# Patient Record
Sex: MALE | Race: OTHER | NOT HISPANIC OR LATINO | URBAN - METROPOLITAN AREA
[De-identification: names, ages, dates, MRNs, and addresses within clinical notes are randomized per-mention and may not be internally consistent; named-entity substitution may affect disease eponyms.]

---

## 2023-03-03 LAB — CALPROTECTIN, STOOL: 140 UG/G

## 2023-04-04 ENCOUNTER — APPOINTMENT (OUTPATIENT)
Dept: LAB | Facility: LAB | Age: 38
End: 2023-04-04
Payer: COMMERCIAL

## 2023-04-04 LAB
ALANINE AMINOTRANSFERASE (SGPT) (U/L) IN SER/PLAS: 15 U/L (ref 10–52)
ALBUMIN (G/DL) IN SER/PLAS: 4.2 G/DL (ref 3.4–5)
ALKALINE PHOSPHATASE (U/L) IN SER/PLAS: 58 U/L (ref 33–120)
ANION GAP IN SER/PLAS: 12 MMOL/L (ref 10–20)
ASPARTATE AMINOTRANSFERASE (SGOT) (U/L) IN SER/PLAS: 12 U/L (ref 9–39)
BILIRUBIN TOTAL (MG/DL) IN SER/PLAS: 0.4 MG/DL (ref 0–1.2)
C REACTIVE PROTEIN (MG/L) IN SER/PLAS: 0.85 MG/DL
CALCIUM (MG/DL) IN SER/PLAS: 9.5 MG/DL (ref 8.6–10.6)
CARBON DIOXIDE, TOTAL (MMOL/L) IN SER/PLAS: 28 MMOL/L (ref 21–32)
CHLORIDE (MMOL/L) IN SER/PLAS: 105 MMOL/L (ref 98–107)
CREATININE (MG/DL) IN SER/PLAS: 0.76 MG/DL (ref 0.5–1.3)
GFR MALE: >90 ML/MIN/1.73M2
GLUCOSE (MG/DL) IN SER/PLAS: 90 MG/DL (ref 74–99)
POTASSIUM (MMOL/L) IN SER/PLAS: 4.2 MMOL/L (ref 3.5–5.3)
PROTEIN TOTAL: 7.4 G/DL (ref 6.4–8.2)
SEDIMENTATION RATE, ERYTHROCYTE: 3 MM/H (ref 0–15)
SODIUM (MMOL/L) IN SER/PLAS: 141 MMOL/L (ref 136–145)
UREA NITROGEN (MG/DL) IN SER/PLAS: 15 MG/DL (ref 6–23)

## 2023-06-17 LAB
CALPROTECTIN, STOOL: 358 UG/G
SARS-COV-2 RESULT: NOT DETECTED

## 2023-06-21 ENCOUNTER — OFFICE VISIT (OUTPATIENT)
Dept: PRIMARY CARE | Facility: CLINIC | Age: 38
End: 2023-06-21
Payer: COMMERCIAL

## 2023-06-21 ENCOUNTER — LAB (OUTPATIENT)
Dept: LAB | Facility: LAB | Age: 38
End: 2023-06-21
Payer: COMMERCIAL

## 2023-06-21 DIAGNOSIS — J02.9 ACUTE PHARYNGITIS, UNSPECIFIED ETIOLOGY: ICD-10-CM

## 2023-06-21 DIAGNOSIS — J02.9 ACUTE PHARYNGITIS, UNSPECIFIED ETIOLOGY: Primary | ICD-10-CM

## 2023-06-21 LAB
EBV INTERPRETATION: ABNORMAL
EPSTEIN-BARR VCA IGG: POSITIVE
EPSTEIN-BARR VCA IGM: NEGATIVE
EPSTEIN-BARR VIRUS EARLY ANTIGEN ANTIBODY, IGG: POSITIVE
EPSTIEN-BARR NUCLEAR ANTIGEN AB: POSITIVE
POC RAPID STREP: NEGATIVE

## 2023-06-21 PROCEDURE — 86645 CMV ANTIBODY IGM: CPT

## 2023-06-21 PROCEDURE — 86663 EPSTEIN-BARR ANTIBODY: CPT

## 2023-06-21 PROCEDURE — 87081 CULTURE SCREEN ONLY: CPT

## 2023-06-21 PROCEDURE — 86664 EPSTEIN-BARR NUCLEAR ANTIGEN: CPT

## 2023-06-21 PROCEDURE — 86665 EPSTEIN-BARR CAPSID VCA: CPT

## 2023-06-21 PROCEDURE — 87205 SMEAR GRAM STAIN: CPT

## 2023-06-21 PROCEDURE — 87635 SARS-COV-2 COVID-19 AMP PRB: CPT

## 2023-06-21 PROCEDURE — 36415 COLL VENOUS BLD VENIPUNCTURE: CPT

## 2023-06-21 PROCEDURE — 87880 STREP A ASSAY W/OPTIC: CPT | Performed by: INTERNAL MEDICINE

## 2023-06-21 PROCEDURE — 99213 OFFICE O/P EST LOW 20 MIN: CPT | Performed by: INTERNAL MEDICINE

## 2023-06-21 ASSESSMENT — ENCOUNTER SYMPTOMS
VOMITING: 0
TROUBLE SWALLOWING: 0
SHORTNESS OF BREATH: 0
HOARSE VOICE: 0
SWOLLEN GLANDS: 1
NECK PAIN: 0
COUGH: 0
ABDOMINAL PAIN: 0
SORE THROAT: 1
DIARRHEA: 1
STRIDOR: 0
HEADACHES: 0

## 2023-06-21 NOTE — PROGRESS NOTES
Answers submitted by the patient for this visit:  Sore Throat Questionnaire (Submitted on 6/21/2023)  Chief Complaint: Sore throat  Chronicity: new  Onset: in the past 7 days  Progression since onset: gradually improving  Pain worse on: left  Fever: 102 - 102.9 F  Fever duration: 3 to 4 days  Pain - numeric: 5/10  abdominal pain: No  congestion: No  cough: No  diarrhea: Yes  drooling: No  ear discharge: No  ear pain: No  headaches: No  hoarse voice: No  neck pain: No  plugged ear sensation: No  shortness of breath: No  stridor: No  swollen glands: Yes  trouble swallowing: No  vomiting: No  strep: No  mono: No      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     1. Pharyngitis with ER visit 6.17, 6.19.23   -presented with odynophagia and fever to 102 startnig 6.17.23   -rapid strep x1 in ER neg - no other testing performed   -reports no rhinrorhea, congestion, +fatigue but also fatigue at baseline   -no cough   +mild tender cervical LAD     2. UC c/b enteropathic arthritis  -[facility]   -Failed Humira   -limited bowel but prominant joint disease  -considering new biologic   [ ]needs shingrix - will come back to office when pharyngitis resolved  -MTX 15 weekly previous to Humira  -HLAB27 neg    3. Bicuspid AV   -TTE 2018     4. 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   -Rapid GAS repeat, GAS culture, routine aerobic bacterial culture and anaerobic culture ordered   -CMV and EBV serology ordered   -defer abx pending test results   -consider empiric abx if  symptoms fail to resolve in 5-7 days despite neg or equivocal testing   -Shingrix when feels back to baseline

## 2023-06-21 NOTE — PATIENT INSTRUCTIONS
Guilherme -     Ordering: rapid strep; strep culture; routine aerobic throat culture; anaerobic bacterial culture; COVID swab and EBV and CMV IGM serology. Results will be on datangohart and I'll message you & you can message me!       CL

## 2023-06-22 LAB
GRAM STAIN: NORMAL
GRAM STN SPEC: NORMAL
RESPIRATORY CULTURE/SMEAR: NORMAL
RESPIRATORY CULTURE/SMEAR: NORMAL
SARS-COV-2 RESULT: NOT DETECTED

## 2023-06-23 LAB — GROUP A STREP SCREEN, CULTURE: NORMAL

## 2023-06-24 LAB — CYTOMEGALOVIRUS IGM ANTIBODY: <8 AU/ML

## 2023-06-26 ENCOUNTER — CLINICAL SUPPORT (OUTPATIENT)
Dept: PRIMARY CARE | Facility: CLINIC | Age: 38
End: 2023-06-26
Payer: COMMERCIAL

## 2023-06-26 DIAGNOSIS — Z23 IMMUNIZATION DUE: ICD-10-CM

## 2023-06-26 PROCEDURE — 90750 HZV VACC RECOMBINANT IM: CPT | Performed by: INTERNAL MEDICINE

## 2023-06-26 PROCEDURE — 90471 IMMUNIZATION ADMIN: CPT | Performed by: INTERNAL MEDICINE

## 2023-07-12 ENCOUNTER — DOCUMENTATION (OUTPATIENT)
Dept: CARE COORDINATION | Facility: CLINIC | Age: 38
End: 2023-07-12
Payer: COMMERCIAL

## 2023-07-12 ENCOUNTER — PATIENT OUTREACH (OUTPATIENT)
Dept: CARE COORDINATION | Facility: CLINIC | Age: 38
End: 2023-07-12
Payer: COMMERCIAL

## 2023-07-12 NOTE — PROGRESS NOTES
Attempted outreach x 3 but call would not connect and unable to leave a msg.  Will try again in near future.

## 2023-07-13 ENCOUNTER — PATIENT OUTREACH (OUTPATIENT)
Dept: CARE COORDINATION | Facility: CLINIC | Age: 38
End: 2023-07-13
Payer: COMMERCIAL

## 2023-07-13 NOTE — PROGRESS NOTES
Spoke w pt post hospital DC.  Reports he is feeling much better since return home.  Meds reviewed; has all meds and adherent to medication regime.  Plans to schedule PCP follow up appt independently.  Encouraged to be seen within 14 days post discharge; rationale explained.  Provided my contact information for future issues or concerns that may arise.  Enrolled in [employer] for additional monitoring and support.

## 2023-07-21 ENCOUNTER — PATIENT OUTREACH (OUTPATIENT)
Dept: CARE COORDINATION | Facility: CLINIC | Age: 38
End: 2023-07-21
Payer: COMMERCIAL

## 2023-07-21 NOTE — PROGRESS NOTES
Outreach call completed to check on pt.  Reports he has been doing as well as expected and does have follow up visit scheduled for 7.27 w Dr. Looney, then again on 8.8.  Prefers to wait on seeing PCP until after the postop visit w Dr. Looney.  Plan follow up within the next few weeks; agreeable.

## 2023-08-09 ENCOUNTER — PATIENT OUTREACH (OUTPATIENT)
Dept: CARE COORDINATION | Facility: CLINIC | Age: 38
End: 2023-08-09
Payer: COMMERCIAL

## 2023-08-09 NOTE — PROGRESS NOTES
Brief chart review conducted prior to outreach call; no new hospitalizations or ED visits noted.  Had GI appt yesterday and will follow up again in the next 2 months.  Provided my contact information for return call if needed or for any future issues or concerns.  Will close for DAVID Case Mgt at this time.

## 2023-08-11 ENCOUNTER — PATIENT OUTREACH (OUTPATIENT)
Dept: CARE COORDINATION | Facility: CLINIC | Age: 38
End: 2023-08-11
Payer: COMMERCIAL

## 2023-09-19 ENCOUNTER — LAB (OUTPATIENT)
Dept: LAB | Facility: LAB | Age: 38
End: 2023-09-19
Payer: COMMERCIAL

## 2023-09-19 LAB
ALANINE AMINOTRANSFERASE (SGPT) (U/L) IN SER/PLAS: 20 U/L (ref 10–52)
ALBUMIN (G/DL) IN SER/PLAS: 4.5 G/DL (ref 3.4–5)
ALKALINE PHOSPHATASE (U/L) IN SER/PLAS: 60 U/L (ref 33–120)
ASPARTATE AMINOTRANSFERASE (SGOT) (U/L) IN SER/PLAS: 16 U/L (ref 9–39)
BASOPHILS (10*3/UL) IN BLOOD BY AUTOMATED COUNT: 0.02 X10E9/L (ref 0–0.1)
BASOPHILS/100 LEUKOCYTES IN BLOOD BY AUTOMATED COUNT: 0.3 % (ref 0–2)
BILIRUBIN DIRECT (MG/DL) IN SER/PLAS: 0.1 MG/DL (ref 0–0.3)
BILIRUBIN TOTAL (MG/DL) IN SER/PLAS: 0.5 MG/DL (ref 0–1.2)
C REACTIVE PROTEIN (MG/L) IN SER/PLAS: 0.28 MG/DL
CHOLESTEROL (MG/DL) IN SER/PLAS: 157 MG/DL (ref 0–199)
CHOLESTEROL IN HDL (MG/DL) IN SER/PLAS: 53.9 MG/DL
CHOLESTEROL/HDL RATIO: 2.9
EOSINOPHILS (10*3/UL) IN BLOOD BY AUTOMATED COUNT: 0.1 X10E9/L (ref 0–0.7)
EOSINOPHILS/100 LEUKOCYTES IN BLOOD BY AUTOMATED COUNT: 1.6 % (ref 0–6)
ERYTHROCYTE DISTRIBUTION WIDTH (RATIO) BY AUTOMATED COUNT: 13 % (ref 11.5–14.5)
ERYTHROCYTE MEAN CORPUSCULAR HEMOGLOBIN CONCENTRATION (G/DL) BY AUTOMATED: 33 G/DL (ref 32–36)
ERYTHROCYTE MEAN CORPUSCULAR VOLUME (FL) BY AUTOMATED COUNT: 82 FL (ref 80–100)
ERYTHROCYTES (10*6/UL) IN BLOOD BY AUTOMATED COUNT: 4.89 X10E12/L (ref 4.5–5.9)
HEMATOCRIT (%) IN BLOOD BY AUTOMATED COUNT: 40.3 % (ref 41–52)
HEMOGLOBIN (G/DL) IN BLOOD: 13.3 G/DL (ref 13.5–17.5)
IMMATURE GRANULOCYTES/100 LEUKOCYTES IN BLOOD BY AUTOMATED COUNT: 0.5 % (ref 0–0.9)
LDL: 88 MG/DL (ref 0–99)
LEUKOCYTES (10*3/UL) IN BLOOD BY AUTOMATED COUNT: 6.1 X10E9/L (ref 4.4–11.3)
LYMPHOCYTES (10*3/UL) IN BLOOD BY AUTOMATED COUNT: 1.73 X10E9/L (ref 1.2–4.8)
LYMPHOCYTES/100 LEUKOCYTES IN BLOOD BY AUTOMATED COUNT: 28.5 % (ref 13–44)
MONOCYTES (10*3/UL) IN BLOOD BY AUTOMATED COUNT: 0.51 X10E9/L (ref 0.1–1)
MONOCYTES/100 LEUKOCYTES IN BLOOD BY AUTOMATED COUNT: 8.4 % (ref 2–10)
NEUTROPHILS (10*3/UL) IN BLOOD BY AUTOMATED COUNT: 3.68 X10E9/L (ref 1.2–7.7)
NEUTROPHILS/100 LEUKOCYTES IN BLOOD BY AUTOMATED COUNT: 60.7 % (ref 40–80)
NRBC (PER 100 WBCS) BY AUTOMATED COUNT: 0 /100 WBC (ref 0–0)
PLATELETS (10*3/UL) IN BLOOD AUTOMATED COUNT: 273 X10E9/L (ref 150–450)
PROTEIN TOTAL: 8.1 G/DL (ref 6.4–8.2)
TRIGLYCERIDE (MG/DL) IN SER/PLAS: 76 MG/DL (ref 0–149)
VLDL: 15 MG/DL (ref 0–40)

## 2023-09-22 LAB — CALPROTECTIN, STOOL: 198 UG/G

## 2023-09-27 ENCOUNTER — HOSPITAL ENCOUNTER (OUTPATIENT)
Dept: DATA CONVERSION | Facility: HOSPITAL | Age: 38
End: 2023-09-27
Attending: INTERNAL MEDICINE | Admitting: INTERNAL MEDICINE
Payer: COMMERCIAL

## 2023-09-27 DIAGNOSIS — K51.20 ULCERATIVE (CHRONIC) PROCTITIS WITHOUT COMPLICATIONS (MULTI): ICD-10-CM

## 2023-09-27 DIAGNOSIS — K51.211 ULCERATIVE (CHRONIC) PROCTITIS WITH RECTAL BLEEDING (MULTI): ICD-10-CM

## 2023-10-03 LAB
COMPLETE PATHOLOGY REPORT: NORMAL
CONVERTED CLINICAL DIAGNOSIS-HISTORY: NORMAL
CONVERTED FINAL DIAGNOSIS: NORMAL
CONVERTED FINAL REPORT PDF LINK TO COPY AND PASTE: NORMAL
CONVERTED GROSS DESCRIPTION: NORMAL
CONVERTED MICROSCOPIC DESCRIPTION: NORMAL

## 2023-10-19 ENCOUNTER — SPECIALTY PHARMACY (OUTPATIENT)
Dept: PHARMACY | Facility: CLINIC | Age: 38
End: 2023-10-19

## 2023-10-20 DIAGNOSIS — K51.211 ULCERATIVE PROCTITIS WITH RECTAL BLEEDING (MULTI): Primary | ICD-10-CM

## 2023-10-20 RX ORDER — TOFACITINIB 10 MG/1
1 TABLET, FILM COATED ORAL 2 TIMES DAILY
COMMUNITY
Start: 2023-06-26 | End: 2023-10-20 | Stop reason: SDUPTHER

## 2023-10-20 RX ORDER — TOFACITINIB 10 MG/1
10 TABLET, FILM COATED ORAL 2 TIMES DAILY
Qty: 180 TABLET | Refills: 1 | Status: SHIPPED | OUTPATIENT
Start: 2023-10-20 | End: 2023-11-07 | Stop reason: ALTCHOICE

## 2023-10-23 ENCOUNTER — PHARMACY VISIT (OUTPATIENT)
Dept: PHARMACY | Facility: CLINIC | Age: 38
End: 2023-10-23
Payer: COMMERCIAL

## 2023-10-24 DIAGNOSIS — K51.311 ULCERATIVE RECTOSIGMOIDITIS WITH RECTAL BLEEDING (MULTI): Primary | ICD-10-CM

## 2023-10-27 ENCOUNTER — PHARMACY VISIT (OUTPATIENT)
Dept: PHARMACY | Facility: CLINIC | Age: 38
End: 2023-10-27
Payer: COMMERCIAL

## 2023-10-27 PROCEDURE — RXMED WILLOW AMBULATORY MEDICATION CHARGE

## 2023-10-27 RX ORDER — HYDROCORTISONE ACETATE 25 MG/1
SUPPOSITORY RECTAL
Qty: 24 SUPPOSITORY | Refills: 1 | Status: SHIPPED | OUTPATIENT
Start: 2023-10-27 | End: 2024-01-03 | Stop reason: SDUPTHER

## 2023-11-05 PROBLEM — M77.9 ENTHESOPATHY: Status: ACTIVE | Noted: 2023-11-05

## 2023-11-05 PROBLEM — R01.1 CARDIAC MURMUR: Status: ACTIVE | Noted: 2023-11-05

## 2023-11-05 PROBLEM — R19.7 DIARRHEA OF PRESUMED INFECTIOUS ORIGIN: Status: ACTIVE | Noted: 2023-11-05

## 2023-11-05 PROBLEM — M20.5X2 ACQUIRED CLAW TOE OF LEFT FOOT: Status: ACTIVE | Noted: 2023-11-05

## 2023-11-05 PROBLEM — E05.90 SUBCLINICAL HYPERTHYROIDISM: Status: ACTIVE | Noted: 2020-02-04

## 2023-11-05 PROBLEM — M46.1 SACROILIITIS (CMS-HCC): Status: ACTIVE | Noted: 2023-11-05

## 2023-11-05 PROBLEM — K51.211 ULCERATIVE PROCTITIS WITH RECTAL BLEEDING (MULTI): Status: ACTIVE | Noted: 2023-11-05

## 2023-11-05 PROBLEM — S69.90XA FINGER INJURY: Status: ACTIVE | Noted: 2023-11-05

## 2023-11-05 PROBLEM — D84.9 IMMUNOCOMPROMISED PATIENT (MULTI): Status: ACTIVE | Noted: 2023-11-05

## 2023-11-05 PROBLEM — L91.8 OTHER HYPERTROPHIC DISORDERS OF THE SKIN: Status: ACTIVE | Noted: 2020-10-23

## 2023-11-05 PROBLEM — K51.319: Status: ACTIVE | Noted: 2021-12-13

## 2023-11-05 PROBLEM — N28.1 CYST OF LEFT KIDNEY: Status: ACTIVE | Noted: 2023-11-05

## 2023-11-05 PROBLEM — M65.90 SYNOVITIS: Status: ACTIVE | Noted: 2023-11-05

## 2023-11-05 PROBLEM — M20.21 HALLUX RIGIDUS OF BOTH FEET: Status: ACTIVE | Noted: 2023-11-05

## 2023-11-05 PROBLEM — E55.9 VITAMIN D DEFICIENCY: Status: ACTIVE | Noted: 2023-11-05

## 2023-11-05 PROBLEM — M07.60 ENTEROPATHIC ARTHRITIS: Status: ACTIVE | Noted: 2020-02-04

## 2023-11-05 PROBLEM — D22.4 MELANOCYTIC NEVI OF SCALP AND NECK: Status: ACTIVE | Noted: 2020-10-23

## 2023-11-05 PROBLEM — M20.22 HALLUX RIGIDUS OF BOTH FEET: Status: ACTIVE | Noted: 2023-11-05

## 2023-11-05 PROBLEM — K62.5 BRIGHT RED BLOOD PER RECTUM: Status: ACTIVE | Noted: 2023-11-05

## 2023-11-05 PROBLEM — M65.9 SYNOVITIS: Status: ACTIVE | Noted: 2023-11-05

## 2023-11-05 PROBLEM — M47.816 FACET ARTHROPATHY, LUMBAR: Status: ACTIVE | Noted: 2023-11-05

## 2023-11-05 PROBLEM — M79.89 SWELLING OF FINGER, LEFT: Status: ACTIVE | Noted: 2023-11-05

## 2023-11-05 PROBLEM — D18.01 HEMANGIOMA OF SKIN AND SUBCUTANEOUS TISSUE: Status: ACTIVE | Noted: 2020-10-23

## 2023-11-05 RX ORDER — ADALIMUMAB 40MG/0.4ML
KIT SUBCUTANEOUS
COMMUNITY
Start: 2022-08-15 | End: 2023-11-07 | Stop reason: ALTCHOICE

## 2023-11-05 RX ORDER — METHYLCELLULOSE 500 MG/1
2 TABLET ORAL NIGHTLY
COMMUNITY
End: 2023-11-07 | Stop reason: ALTCHOICE

## 2023-11-07 ENCOUNTER — PHARMACY VISIT (OUTPATIENT)
Dept: PHARMACY | Facility: CLINIC | Age: 38
End: 2023-11-07
Payer: COMMERCIAL

## 2023-11-07 ENCOUNTER — OFFICE VISIT (OUTPATIENT)
Dept: GASTROENTEROLOGY | Facility: HOSPITAL | Age: 38
End: 2023-11-07
Payer: COMMERCIAL

## 2023-11-07 VITALS
TEMPERATURE: 97.8 F | WEIGHT: 165 LBS | RESPIRATION RATE: 18 BRPM | HEIGHT: 69 IN | SYSTOLIC BLOOD PRESSURE: 113 MMHG | HEART RATE: 75 BPM | DIASTOLIC BLOOD PRESSURE: 74 MMHG | BODY MASS INDEX: 24.44 KG/M2 | OXYGEN SATURATION: 98 %

## 2023-11-07 DIAGNOSIS — K51.211 ULCERATIVE PROCTITIS WITH RECTAL BLEEDING (MULTI): ICD-10-CM

## 2023-11-07 DIAGNOSIS — K51.319 ULCERATIVE COLITIS, RECTOSIGMOID, UNSPECIFIED COMPLICATION (MULTI): Primary | ICD-10-CM

## 2023-11-07 DIAGNOSIS — K51.211 ULCERATIVE PROCTITIS WITH RECTAL BLEEDING (MULTI): Primary | ICD-10-CM

## 2023-11-07 PROCEDURE — 1036F TOBACCO NON-USER: CPT | Performed by: INTERNAL MEDICINE

## 2023-11-07 PROCEDURE — 99213 OFFICE O/P EST LOW 20 MIN: CPT | Performed by: INTERNAL MEDICINE

## 2023-11-07 PROCEDURE — RXMED WILLOW AMBULATORY MEDICATION CHARGE

## 2023-11-07 RX ORDER — FAMOTIDINE 10 MG/ML
20 INJECTION INTRAVENOUS ONCE AS NEEDED
Status: CANCELLED | OUTPATIENT
Start: 2023-11-17

## 2023-11-07 RX ORDER — FOLIC ACID 1 MG/1
1 TABLET ORAL DAILY
Status: SHIPPED | OUTPATIENT
Start: 2023-11-07 | End: 2024-02-05

## 2023-11-07 RX ORDER — ACETAMINOPHEN 325 MG/1
650 TABLET ORAL ONCE
Status: CANCELLED | OUTPATIENT
Start: 2023-11-17 | End: 2023-11-17

## 2023-11-07 RX ORDER — HEPARIN SODIUM,PORCINE/PF 10 UNIT/ML
50 SYRINGE (ML) INTRAVENOUS AS NEEDED
Status: CANCELLED | OUTPATIENT
Start: 2023-11-17

## 2023-11-07 RX ORDER — DIPHENHYDRAMINE HYDROCHLORIDE 50 MG/ML
50 INJECTION INTRAMUSCULAR; INTRAVENOUS AS NEEDED
Status: CANCELLED | OUTPATIENT
Start: 2023-11-17

## 2023-11-07 RX ORDER — EPINEPHRINE 0.3 MG/.3ML
0.3 INJECTION SUBCUTANEOUS EVERY 5 MIN PRN
Status: CANCELLED | OUTPATIENT
Start: 2023-11-17

## 2023-11-07 RX ORDER — METHOTREXATE 2.5 MG/1
10 TABLET ORAL
Qty: 51.429 TABLET | Refills: 3 | Status: SHIPPED | OUTPATIENT
Start: 2023-11-07 | End: 2024-02-02

## 2023-11-07 RX ORDER — ALBUTEROL SULFATE 0.83 MG/ML
3 SOLUTION RESPIRATORY (INHALATION) AS NEEDED
Status: CANCELLED | OUTPATIENT
Start: 2023-11-17

## 2023-11-07 RX ORDER — TOFACITINIB 10 MG/1
10 TABLET, FILM COATED ORAL 2 TIMES DAILY
Qty: 60 TABLET | Refills: 1 | Status: SHIPPED | OUTPATIENT
Start: 2023-11-07 | End: 2023-12-07 | Stop reason: ALTCHOICE

## 2023-11-07 SDOH — ECONOMIC STABILITY: FOOD INSECURITY: WITHIN THE PAST 12 MONTHS, THE FOOD YOU BOUGHT JUST DIDN'T LAST AND YOU DIDN'T HAVE MONEY TO GET MORE.: NEVER TRUE

## 2023-11-07 SDOH — ECONOMIC STABILITY: FOOD INSECURITY: WITHIN THE PAST 12 MONTHS, YOU WORRIED THAT YOUR FOOD WOULD RUN OUT BEFORE YOU GOT MONEY TO BUY MORE.: NEVER TRUE

## 2023-11-07 ASSESSMENT — PAIN SCALES - GENERAL: PAINLEVEL: 4

## 2023-11-07 NOTE — PROGRESS NOTES
Subjective   Patient ID: Guilherme Ocasio is a 38 y.o. male who presents for Follow-up of UC.  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     -h/o bicuspid aortic valve  -nephrolithiasis      Not controlled with oral/topical 5-ASA. Started Humira in mid-8/2022.  Humira controlled all joint discomfort and improved bowel habits, but did not control bowel symptoms. 11/2022 Anser ADA 8.9 and .     12/5/22 F/S showed active Gale 2 proctitis with sharp transition to normal at 20 cm. Path chronic active colitis. Moved to weekly Humira, but never fully effective.     6/2023 Changed to tofacitinib.  Shortly after starting tofacitinib admitted with Norovirus infection. Tofacitinib held and then restarted. Continues at 10 mg BID.    9/2023 on tofa overall doing OK. Still with 4-6 stools daily. Consistency better and blood less. Stool well formed; urgency better. No nocturnal stools. Felt better than when on mesalamine and Humira. Joints are good.     9/2023 F/S still with Gale 2 proctitis.  FCP better at 198.    He thinks he had a viral illness and had some increased symptoms of diarrhea and urgency.  Did a 2 week course of hydrocortisone rectal suppositories; now using every other day.    On average 6 stools daily.  Good day 4 and bad day 8 stools.  Blood in at least 50% of stools.  Urgency persists, better with HC suppositories.  No nocturnal stools. Usually one stool in AM with mucus/blood, then around 9-10 AM after eats has 3-4 stools.  Then rest of the day not much.  No longer doing fiber.  At 2 fiber pills did OK; if took more than that, did not help.  Stool not solid.  Loosely formed.    Review of Systems    Objective   Physical Exam  No acute distress  Anicteric  Assessment/Plan   #Ulcerative proctitis-although he is not worsening, he is not in remission.  He has ongoing clinical symptoms and also has endoscopic and histologic disease activity that is not improved on  Xeljanz 10 mg twice daily now for 16 weeks.  He needs a change in therapy.    We discussed alternatives of treatment including infliximab, IL 23 inhibitors, and ozanimod.  We will go ahead and initiate infliximab along with methotrexate/folate.  We will do standard induction dosing.  Once he starts infliximab, he will discontinue Xeljanz.

## 2023-11-07 NOTE — PATIENT INSTRUCTIONS
As we discussed, you are not responding fully to Xeljanz therapy.  Therefore, we need to change treatment.  As reviewed:    1) check tuberculosis blood test  2) we will transition you to infliximab 5 mg/kg at 0, 2, and 6 weeks and then every 8 weeks for maintenance treatment thereafter.  3) along with infliximab begin methotrexate 2.5 mg tablets taking 4 tablets once weekly.  Also begin folic acid 1 mg daily except the day you take methotrexate  4) once you begin the infliximab and methotrexate, stop Xeljanz  5) complete Shingrix vaccine  6) follow-up in the office in 3 months  7) contact me at the office with any change in symptoms, questions, or concerns

## 2023-11-08 ENCOUNTER — SPECIALTY PHARMACY (OUTPATIENT)
Dept: PHARMACY | Facility: CLINIC | Age: 38
End: 2023-11-08

## 2023-11-10 ENCOUNTER — LAB (OUTPATIENT)
Dept: LAB | Facility: LAB | Age: 38
End: 2023-11-10
Payer: COMMERCIAL

## 2023-11-10 DIAGNOSIS — K51.211 ULCERATIVE PROCTITIS WITH RECTAL BLEEDING (MULTI): ICD-10-CM

## 2023-11-10 DIAGNOSIS — K51.319 ULCERATIVE COLITIS, RECTOSIGMOID, UNSPECIFIED COMPLICATION (MULTI): ICD-10-CM

## 2023-11-10 LAB
ALBUMIN SERPL BCP-MCNC: 4.5 G/DL (ref 3.4–5)
ALBUMIN SERPL BCP-MCNC: 4.5 G/DL (ref 3.4–5)
ALP SERPL-CCNC: 73 U/L (ref 33–120)
ALP SERPL-CCNC: 73 U/L (ref 33–120)
ALT SERPL W P-5'-P-CCNC: 17 U/L (ref 10–52)
ALT SERPL W P-5'-P-CCNC: 17 U/L (ref 10–52)
ANION GAP SERPL CALC-SCNC: 13 MMOL/L (ref 10–20)
AST SERPL W P-5'-P-CCNC: 14 U/L (ref 9–39)
AST SERPL W P-5'-P-CCNC: 14 U/L (ref 9–39)
BASOPHILS # BLD AUTO: 0.04 X10*3/UL (ref 0–0.1)
BASOPHILS NFR BLD AUTO: 0.7 %
BILIRUB DIRECT SERPL-MCNC: 0.1 MG/DL (ref 0–0.3)
BILIRUB SERPL-MCNC: 0.5 MG/DL (ref 0–1.2)
BILIRUB SERPL-MCNC: 0.5 MG/DL (ref 0–1.2)
BUN SERPL-MCNC: 16 MG/DL (ref 6–23)
CALCIUM SERPL-MCNC: 9.4 MG/DL (ref 8.6–10.6)
CHLORIDE SERPL-SCNC: 104 MMOL/L (ref 98–107)
CHOLEST SERPL-MCNC: 152 MG/DL (ref 0–199)
CHOLESTEROL/HDL RATIO: 3
CO2 SERPL-SCNC: 29 MMOL/L (ref 21–32)
CREAT SERPL-MCNC: 0.72 MG/DL (ref 0.5–1.3)
EOSINOPHIL # BLD AUTO: 0.12 X10*3/UL (ref 0–0.7)
EOSINOPHIL NFR BLD AUTO: 2.1 %
ERYTHROCYTE [DISTWIDTH] IN BLOOD BY AUTOMATED COUNT: 12.1 % (ref 11.5–14.5)
GFR SERPL CREATININE-BSD FRML MDRD: >90 ML/MIN/1.73M*2
GLUCOSE SERPL-MCNC: 79 MG/DL (ref 74–99)
HBV SURFACE AG SERPL QL IA: NONREACTIVE
HCT VFR BLD AUTO: 38.4 % (ref 41–52)
HCV AB SER QL: NONREACTIVE
HDLC SERPL-MCNC: 51.5 MG/DL
HGB BLD-MCNC: 12.9 G/DL (ref 13.5–17.5)
IMM GRANULOCYTES # BLD AUTO: 0.01 X10*3/UL (ref 0–0.7)
IMM GRANULOCYTES NFR BLD AUTO: 0.2 % (ref 0–0.9)
LDLC SERPL CALC-MCNC: 88 MG/DL
LYMPHOCYTES # BLD AUTO: 1.41 X10*3/UL (ref 1.2–4.8)
LYMPHOCYTES NFR BLD AUTO: 25.1 %
MCH RBC QN AUTO: 27.9 PG (ref 26–34)
MCHC RBC AUTO-ENTMCNC: 33.6 G/DL (ref 32–36)
MCV RBC AUTO: 83 FL (ref 80–100)
MONOCYTES # BLD AUTO: 0.53 X10*3/UL (ref 0.1–1)
MONOCYTES NFR BLD AUTO: 9.4 %
NEUTROPHILS # BLD AUTO: 3.5 X10*3/UL (ref 1.2–7.7)
NEUTROPHILS NFR BLD AUTO: 62.5 %
NON HDL CHOLESTEROL: 101 MG/DL (ref 0–149)
NRBC BLD-RTO: 0 /100 WBCS (ref 0–0)
PLATELET # BLD AUTO: 282 X10*3/UL (ref 150–450)
POTASSIUM SERPL-SCNC: 4.5 MMOL/L (ref 3.5–5.3)
PROT SERPL-MCNC: 7.8 G/DL (ref 6.4–8.2)
PROT SERPL-MCNC: 7.8 G/DL (ref 6.4–8.2)
RBC # BLD AUTO: 4.62 X10*6/UL (ref 4.5–5.9)
SODIUM SERPL-SCNC: 141 MMOL/L (ref 136–145)
TRIGL SERPL-MCNC: 64 MG/DL (ref 0–149)
VLDL: 13 MG/DL (ref 0–40)
WBC # BLD AUTO: 5.6 X10*3/UL (ref 4.4–11.3)

## 2023-11-10 PROCEDURE — 85025 COMPLETE CBC W/AUTO DIFF WBC: CPT

## 2023-11-10 PROCEDURE — 80053 COMPREHEN METABOLIC PANEL: CPT

## 2023-11-10 PROCEDURE — 86481 TB AG RESPONSE T-CELL SUSP: CPT

## 2023-11-10 PROCEDURE — 87340 HEPATITIS B SURFACE AG IA: CPT

## 2023-11-10 PROCEDURE — 80076 HEPATIC FUNCTION PANEL: CPT

## 2023-11-10 PROCEDURE — 80061 LIPID PANEL: CPT

## 2023-11-10 PROCEDURE — 86803 HEPATITIS C AB TEST: CPT

## 2023-11-10 PROCEDURE — 36415 COLL VENOUS BLD VENIPUNCTURE: CPT

## 2023-11-12 LAB
NIL(NEG) CONTROL SPOT COUNT: NORMAL
PANEL A SPOT COUNT: 0
PANEL B SPOT COUNT: 0
POS CONTROL SPOT COUNT: NORMAL
T-SPOT. TB INTERPRETATION: NEGATIVE

## 2023-11-13 ENCOUNTER — PHARMACY VISIT (OUTPATIENT)
Dept: PHARMACY | Facility: CLINIC | Age: 38
End: 2023-11-13
Payer: COMMERCIAL

## 2023-11-13 PROCEDURE — RXMED WILLOW AMBULATORY MEDICATION CHARGE

## 2023-11-22 ENCOUNTER — SPECIALTY PHARMACY (OUTPATIENT)
Dept: PHARMACY | Facility: CLINIC | Age: 38
End: 2023-11-22

## 2023-11-27 ENCOUNTER — SPECIALTY PHARMACY (OUTPATIENT)
Dept: PHARMACY | Facility: CLINIC | Age: 38
End: 2023-11-27

## 2023-11-30 ENCOUNTER — HOSPITAL ENCOUNTER (OUTPATIENT)
Dept: DIALYSIS | Facility: HOSPITAL | Age: 38
Setting detail: DIALYSIS SERIES
Discharge: HOME | End: 2023-11-30
Payer: COMMERCIAL

## 2023-11-30 VITALS
WEIGHT: 169.09 LBS | SYSTOLIC BLOOD PRESSURE: 108 MMHG | BODY MASS INDEX: 24.97 KG/M2 | HEART RATE: 67 BPM | TEMPERATURE: 97.3 F | DIASTOLIC BLOOD PRESSURE: 66 MMHG

## 2023-11-30 DIAGNOSIS — K51.319 ULCERATIVE COLITIS, RECTOSIGMOID, UNSPECIFIED COMPLICATION (MULTI): Primary | ICD-10-CM

## 2023-11-30 PROCEDURE — 96366 THER/PROPH/DIAG IV INF ADDON: CPT

## 2023-11-30 PROCEDURE — 96365 THER/PROPH/DIAG IV INF INIT: CPT

## 2023-11-30 PROCEDURE — 2500000004 HC RX 250 GENERAL PHARMACY W/ HCPCS (ALT 636 FOR OP/ED)

## 2023-11-30 RX ORDER — FAMOTIDINE 10 MG/ML
20 INJECTION INTRAVENOUS ONCE AS NEEDED
Status: CANCELLED | OUTPATIENT
Start: 2023-12-14

## 2023-11-30 RX ORDER — DIPHENHYDRAMINE HYDROCHLORIDE 50 MG/ML
50 INJECTION INTRAMUSCULAR; INTRAVENOUS AS NEEDED
Status: CANCELLED | OUTPATIENT
Start: 2023-12-14

## 2023-11-30 RX ORDER — ALBUTEROL SULFATE 0.83 MG/ML
3 SOLUTION RESPIRATORY (INHALATION) AS NEEDED
Status: CANCELLED | OUTPATIENT
Start: 2023-12-14

## 2023-11-30 RX ORDER — EPINEPHRINE 0.3 MG/.3ML
0.3 INJECTION SUBCUTANEOUS EVERY 5 MIN PRN
Status: CANCELLED | OUTPATIENT
Start: 2023-12-14

## 2023-11-30 RX ORDER — ACETAMINOPHEN 325 MG/1
TABLET ORAL
Status: COMPLETED
Start: 2023-11-30 | End: 2023-11-30

## 2023-11-30 RX ORDER — ACETAMINOPHEN 325 MG/1
650 TABLET ORAL ONCE
Status: CANCELLED | OUTPATIENT
Start: 2023-12-14 | End: 2023-12-14

## 2023-11-30 RX ORDER — ACETAMINOPHEN 325 MG/1
650 TABLET ORAL ONCE
Status: COMPLETED | OUTPATIENT
Start: 2023-11-30 | End: 2023-11-30

## 2023-11-30 RX ADMIN — ACETAMINOPHEN 650 MG: 325 TABLET ORAL at 09:00

## 2023-11-30 RX ADMIN — INFLIXIMAB 400 MG: 100 INJECTION, POWDER, LYOPHILIZED, FOR SOLUTION INTRAVENOUS at 11:32

## 2023-11-30 NOTE — PATIENT INSTRUCTIONS
Home Going Instructions for Patients receiving Remicade / Inflectra / Renflexis    [ ]  Reactions:  Patients can still have an allergic reaction to Remicade 3-12 days after their Remicade treatment.  Call your doctor right away if you develop any of these symptoms:   Fever  Rash  Headache  Joint pain  Muscle pain  Any other unusual symptom      [ ]  Next Treatment:  If you are scheduled to have another Remicade infusion, you will need to push back treatment if you have any of the following:  Fever  Cold symptoms:   Runny nose  Cough  Chest Congestion  Flu-like symptoms:  body aches or malaise   Remicade can lower your body's ability to fight infections. Taking Remicade when you are ill can make an infection you already have worse.    Call [phone] if you think you need to re-schedule your treatment.    If you have any of the symptoms mentioned above please call:  Between 8:30 am and 5:30 pm [phone] to speak with a Nurse  After 5:30 pm [phone] and ask the Hospital  to page the GI Fellow on call.

## 2023-12-04 ENCOUNTER — PHARMACY VISIT (OUTPATIENT)
Dept: PHARMACY | Facility: CLINIC | Age: 38
End: 2023-12-04
Payer: COMMERCIAL

## 2023-12-04 PROCEDURE — RXMED WILLOW AMBULATORY MEDICATION CHARGE

## 2023-12-05 ENCOUNTER — PHARMACY VISIT (OUTPATIENT)
Dept: PHARMACY | Facility: CLINIC | Age: 38
End: 2023-12-05
Payer: COMMERCIAL

## 2023-12-05 DIAGNOSIS — K51.319 ULCERATIVE COLITIS, RECTOSIGMOID, UNSPECIFIED COMPLICATION (MULTI): ICD-10-CM

## 2023-12-05 RX ORDER — INFLIXIMAB 100 MG/1
400 INJECTION, POWDER, LYOPHILIZED, FOR SOLUTION INTRAVENOUS ONCE
Qty: 400 MG | Refills: 0 | Status: CANCELLED | OUTPATIENT
Start: 2023-12-05 | End: 2023-12-05

## 2023-12-07 PROCEDURE — RXMED WILLOW AMBULATORY MEDICATION CHARGE

## 2023-12-08 ENCOUNTER — PHARMACY VISIT (OUTPATIENT)
Dept: PHARMACY | Facility: CLINIC | Age: 38
End: 2023-12-08
Payer: COMMERCIAL

## 2023-12-12 ENCOUNTER — SPECIALTY PHARMACY (OUTPATIENT)
Dept: PHARMACY | Facility: CLINIC | Age: 38
End: 2023-12-12

## 2023-12-14 ENCOUNTER — HOSPITAL ENCOUNTER (OUTPATIENT)
Dept: DIALYSIS | Facility: HOSPITAL | Age: 38
Setting detail: DIALYSIS SERIES
Discharge: HOME | End: 2023-12-14
Payer: COMMERCIAL

## 2023-12-14 VITALS
HEART RATE: 62 BPM | WEIGHT: 168.21 LBS | TEMPERATURE: 97 F | DIASTOLIC BLOOD PRESSURE: 67 MMHG | BODY MASS INDEX: 24.84 KG/M2 | SYSTOLIC BLOOD PRESSURE: 110 MMHG

## 2023-12-14 DIAGNOSIS — K51.319 ULCERATIVE COLITIS, RECTOSIGMOID, UNSPECIFIED COMPLICATION (MULTI): ICD-10-CM

## 2023-12-14 DIAGNOSIS — K51.319 ULCERATIVE COLITIS, RECTOSIGMOID, UNSPECIFIED COMPLICATION (MULTI): Primary | ICD-10-CM

## 2023-12-14 PROCEDURE — 2500000004 HC RX 250 GENERAL PHARMACY W/ HCPCS (ALT 636 FOR OP/ED): Mod: JZ

## 2023-12-14 PROCEDURE — 96375 TX/PRO/DX INJ NEW DRUG ADDON: CPT

## 2023-12-14 PROCEDURE — 96365 THER/PROPH/DIAG IV INF INIT: CPT

## 2023-12-14 RX ORDER — FAMOTIDINE 10 MG/ML
20 INJECTION INTRAVENOUS ONCE AS NEEDED
Status: CANCELLED | OUTPATIENT
Start: 2023-12-28

## 2023-12-14 RX ORDER — DIPHENHYDRAMINE HYDROCHLORIDE 50 MG/ML
50 INJECTION INTRAMUSCULAR; INTRAVENOUS AS NEEDED
Status: CANCELLED | OUTPATIENT
Start: 2023-12-28

## 2023-12-14 RX ORDER — ALBUTEROL SULFATE 0.83 MG/ML
3 SOLUTION RESPIRATORY (INHALATION) AS NEEDED
Status: CANCELLED | OUTPATIENT
Start: 2023-12-28

## 2023-12-14 RX ORDER — EPINEPHRINE 0.3 MG/.3ML
0.3 INJECTION SUBCUTANEOUS EVERY 5 MIN PRN
Status: CANCELLED | OUTPATIENT
Start: 2023-12-28

## 2023-12-14 RX ORDER — HEPARIN SODIUM,PORCINE/PF 10 UNIT/ML
50 SYRINGE (ML) INTRAVENOUS AS NEEDED
OUTPATIENT
Start: 2023-12-14

## 2023-12-14 RX ORDER — INFLIXIMAB 100 MG/1
400 INJECTION, POWDER, LYOPHILIZED, FOR SOLUTION INTRAVENOUS ONCE
Qty: 400 MG | Refills: 0 | Status: CANCELLED | OUTPATIENT
Start: 2023-12-14 | End: 2023-12-14

## 2023-12-14 RX ADMIN — INFLIXIMAB 400 MG: 100 INJECTION, POWDER, LYOPHILIZED, FOR SOLUTION INTRAVENOUS at 13:08

## 2023-12-14 NOTE — PROGRESS NOTES
[facility]   Infusion Clinic Note   Date: 2023   Name: Guilherme Ocasio  : 1985   MRN: [id number]         Reason for Visit: Renflexis infusion      Accompanied by:Self   Visit Type:: Infusion   Diagnosis: Ulcerative colitis, rectosigmoid, unspecified complication (CMS/HCC)    Allergies:   Allergies as of 2023    (No Known Allergies)      Current Meds [unfilled]        Vitals:  Vitals:    23 1300   BP: 114/77   Pulse: 77      Infusion Pre-procedure Checklist   Allergies reviewed: yes   Medications reviewed: no   Contraindications to treatment:No   Previous reaction to current treatment:No   Current Health Issues: None   Pain: '    Is the pain different from normal: No   Is the pain tolerable: no   Is your Doctor aware: n/a   Contraindications based on patient history: No   Provider notified: Not applicable   Labs: None      Infusion Readiness:   Assessment Concerns Related to Infusion: No  Provider notified: no  Assess patient for the concerns below. Document provider notification as appropriate:  - Does not meet criteria to treat No  - Has an active or recent infection with/without current antibiotic use No  - Has recent/planned dental work No  - Has recent/planned surgeries No  - Has recently received or plans to receive vaccinations No  - Has treatment related toxicities No  - Is pregnant (unless noted otherwise) N/A    Initiated By: Felicia Galindo RN   Time: 1:54 PM     Here for second loading dose. Feels slight improvement in symptoms. Patient tolerated infusion well with titration. PIV removed and patient discharged.    [unfilled]

## 2023-12-27 PROCEDURE — RXMED WILLOW AMBULATORY MEDICATION CHARGE

## 2024-01-03 ENCOUNTER — SPECIALTY PHARMACY (OUTPATIENT)
Dept: PHARMACY | Facility: CLINIC | Age: 39
End: 2024-01-03

## 2024-01-03 DIAGNOSIS — K51.311 ULCERATIVE RECTOSIGMOIDITIS WITH RECTAL BLEEDING (MULTI): ICD-10-CM

## 2024-01-03 PROCEDURE — RXMED WILLOW AMBULATORY MEDICATION CHARGE

## 2024-01-03 RX ORDER — HYDROCORTISONE ACETATE 25 MG/1
SUPPOSITORY RECTAL
Qty: 24 SUPPOSITORY | Refills: 1 | Status: SHIPPED | OUTPATIENT
Start: 2024-01-03 | End: 2024-01-14 | Stop reason: SDUPTHER

## 2024-01-03 NOTE — ADDENDUM NOTE
Encounter addended by: Kevin Looney MD on: 1/3/2024 4:23 PM   Actions taken: Order list changed, Cosign clinical note, Clinical Note Signed, Therapy plan modified

## 2024-01-04 ENCOUNTER — PHARMACY VISIT (OUTPATIENT)
Dept: PHARMACY | Facility: CLINIC | Age: 39
End: 2024-01-04
Payer: COMMERCIAL

## 2024-01-06 ENCOUNTER — PHARMACY VISIT (OUTPATIENT)
Dept: PHARMACY | Facility: CLINIC | Age: 39
End: 2024-01-06
Payer: COMMERCIAL

## 2024-01-08 DIAGNOSIS — K51.319 ULCERATIVE COLITIS, RECTOSIGMOID, UNSPECIFIED COMPLICATION (MULTI): ICD-10-CM

## 2024-01-11 ENCOUNTER — HOSPITAL ENCOUNTER (OUTPATIENT)
Dept: DIALYSIS | Facility: HOSPITAL | Age: 39
Setting detail: DIALYSIS SERIES
Discharge: HOME | End: 2024-01-11
Payer: COMMERCIAL

## 2024-01-11 VITALS
WEIGHT: 166.01 LBS | HEART RATE: 63 BPM | DIASTOLIC BLOOD PRESSURE: 65 MMHG | SYSTOLIC BLOOD PRESSURE: 102 MMHG | BODY MASS INDEX: 24.51 KG/M2 | TEMPERATURE: 97.3 F

## 2024-01-11 DIAGNOSIS — K51.319 ULCERATIVE COLITIS, RECTOSIGMOID, UNSPECIFIED COMPLICATION (MULTI): Primary | ICD-10-CM

## 2024-01-11 PROCEDURE — 96365 THER/PROPH/DIAG IV INF INIT: CPT

## 2024-01-11 PROCEDURE — 96366 THER/PROPH/DIAG IV INF ADDON: CPT

## 2024-01-11 PROCEDURE — 2500000004 HC RX 250 GENERAL PHARMACY W/ HCPCS (ALT 636 FOR OP/ED): Performed by: INTERNAL MEDICINE

## 2024-01-11 RX ORDER — EPINEPHRINE 0.3 MG/.3ML
0.3 INJECTION SUBCUTANEOUS EVERY 5 MIN PRN
OUTPATIENT
Start: 2024-01-25

## 2024-01-11 RX ORDER — DIPHENHYDRAMINE HYDROCHLORIDE 50 MG/ML
50 INJECTION INTRAMUSCULAR; INTRAVENOUS AS NEEDED
OUTPATIENT
Start: 2024-01-25

## 2024-01-11 RX ORDER — ALBUTEROL SULFATE 0.83 MG/ML
3 SOLUTION RESPIRATORY (INHALATION) AS NEEDED
OUTPATIENT
Start: 2024-01-25

## 2024-01-11 RX ORDER — FAMOTIDINE 10 MG/ML
20 INJECTION INTRAVENOUS ONCE AS NEEDED
OUTPATIENT
Start: 2024-01-25

## 2024-01-11 RX ADMIN — INFLIXIMAB 400 MG: 100 INJECTION, POWDER, LYOPHILIZED, FOR SOLUTION INTRAVENOUS at 13:24

## 2024-01-11 NOTE — PROGRESS NOTES
[facility]   Infusion Clinic Note   Date: 2024   Name: Guilherme Ocasio  : 1985   MRN: [id number]         Reason for Visit: Renflexis infusion- last loading dose      Accompanied by:Self   Visit Type:: Infusion   Diagnosis: Ulcerative colitis, rectosigmoid, unspecified complication (CMS/HCC)    Allergies:   Allergies as of 2024    (No Known Allergies)      Current Meds [unfilled]        Vitals:  Vitals:    24 1316   BP: 109/71   Pulse: 72   Temp: 36.3 °C (97.3 °F)   Weight: 75.3 kg (166 lb 0.1 oz)      Infusion Pre-procedure Checklist   Allergies reviewed: yes   Medications reviewed: no   Contraindications to treatment:No   Previous reaction to current treatment:No   Current Health Issues: None   Contraindications based on patient history: No   Provider notified: Not applicable   Labs: None   Infusion Readiness:   Assessment Concerns Related to Infusion: No  Provider notified: n/a  Assess patient for the concerns below. Document provider notification as appropriate:  - Does not meet criteria to treat No  - Has an active or recent infection with/without current antibiotic use No  - Has recent/planned dental work No  - Has recent/planned surgeries No  - Has recently received or plans to receive vaccinations No  - Has treatment related toxicities No  - Is pregnant (unless noted otherwise) N/A    Initiated By: Felicia Galindo RN   Time: 1:39 PM     Patient has completed last loading dose of Renflexis. Will now be receiving every 56 days. Infused without issue. PIV removed and patient discharged

## 2024-01-14 DIAGNOSIS — K51.311 ULCERATIVE RECTOSIGMOIDITIS WITH RECTAL BLEEDING (MULTI): ICD-10-CM

## 2024-01-14 RX ORDER — HYDROCORTISONE ACETATE 25 MG/1
SUPPOSITORY RECTAL
Qty: 24 SUPPOSITORY | Refills: 3 | Status: SHIPPED | OUTPATIENT
Start: 2024-01-14 | End: 2024-03-05 | Stop reason: ALTCHOICE

## 2024-01-16 DIAGNOSIS — K51.319 ULCERATIVE COLITIS, RECTOSIGMOID, UNSPECIFIED COMPLICATION (MULTI): ICD-10-CM

## 2024-01-17 DIAGNOSIS — K51.319 ULCERATIVE COLITIS, RECTOSIGMOID, UNSPECIFIED COMPLICATION (MULTI): ICD-10-CM

## 2024-01-19 DIAGNOSIS — K51.211 ULCERATIVE PROCTITIS WITH RECTAL BLEEDING (MULTI): Primary | ICD-10-CM

## 2024-01-23 ENCOUNTER — OFFICE VISIT (OUTPATIENT)
Dept: GASTROENTEROLOGY | Facility: HOSPITAL | Age: 39
End: 2024-01-23
Payer: COMMERCIAL

## 2024-01-23 VITALS
OXYGEN SATURATION: 98 % | HEART RATE: 72 BPM | HEIGHT: 69 IN | TEMPERATURE: 98.1 F | SYSTOLIC BLOOD PRESSURE: 119 MMHG | DIASTOLIC BLOOD PRESSURE: 69 MMHG | BODY MASS INDEX: 23.99 KG/M2 | WEIGHT: 162 LBS

## 2024-01-23 DIAGNOSIS — R10.30 LOWER ABDOMINAL PAIN: ICD-10-CM

## 2024-01-23 DIAGNOSIS — K51.211 ULCERATIVE PROCTITIS WITH RECTAL BLEEDING (MULTI): Primary | ICD-10-CM

## 2024-01-23 PROCEDURE — 99213 OFFICE O/P EST LOW 20 MIN: CPT | Performed by: INTERNAL MEDICINE

## 2024-01-23 PROCEDURE — 1036F TOBACCO NON-USER: CPT | Performed by: INTERNAL MEDICINE

## 2024-01-23 ASSESSMENT — PAIN SCALES - GENERAL: PAINLEVEL: 4

## 2024-01-23 NOTE — PATIENT INSTRUCTIONS
Unfortunately, you are not responding to infliximab.  We will move on to upadacitinib (Rinvoq).  As reviewed today:    Plan:  1) stop infliximab infusions  2) check stool fecal calprotectin  3) check CBC and CRP  4) begin upadacitinib (Rinvoq) 45 mg taking 1 tablet daily.  This will be for 8 weeks.  If you respond, we will then lower the dose to 30 mg at that time.  5) contact me in 2 weeks with an update on how you are doing  6) follow-up in the office in 6 to 8 weeks

## 2024-01-23 NOTE — PROGRESS NOTES
"REASON FOR VISIT:  ulcerative proctosigmoiditis    HPI:  Guilherme Ocasio is a 38 y.o. male who presents for follow-up of UC.   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      -h/o bicuspid aortic valve  -nephrolithiasis      Not controlled with oral/topical 5-ASA. Started Humira in mid-8/2022.  Humira controlled all joint discomfort and improved bowel habits, but did not control bowel symptoms. 11/2022 Anser ADA 8.9 and .     12/5/22 F/S showed active Gale 2 proctitis with sharp transition to normal at 20 cm. Path chronic active colitis. Moved to weekly Humira, but never fully effective.     6/2023 Changed to tofacitinib.  Shortly after starting tofacitinib admitted with Norovirus infection. Tofacitinib held and then restarted. Continues at 10 mg BID.     9/2023 on tofa overall doing OK. Still with 4-6 stools daily. Consistency better and blood less. Stool well formed; urgency better. No nocturnal stools. Felt better than when on mesalamine and Humira. Joints are good.     9/2023 F/S still with Gale 2 proctitis.  FCP better at 198.     He thinks he had a viral illness and had some increased symptoms of diarrhea and urgency.  Did a 2 week course of hydrocortisone rectal suppositories; now using every other day.     11/2023 on average 6 stools daily.  Good day 4 and bad day 8 stools.  Blood in at least 50% of stools.  Urgency persists, better with HC suppositories.  No nocturnal stools. Usually one stool in AM with mucus/blood, then around 9-10 AM after eats has 3-4 stools.  Then rest of the day not much.  No longer doing fiber.  At 2 fiber pills did OK; if took more than that, did not help.  Stool not solid.  Loosely formed.  Decision made to change to inflximab.    Returns now having completed infliximab induction therapy.  Unfortunately, since changing from Xeljanz to infliximab, his GI symptoms are worsened, though joints are better- inf act joints are \"perfect\".  However, frequency 12-14 " trips to  in 24 hours.  4-6 stools are only mucus and blood and the rest will have mushy stool.  Blood visible in close to every stool.  Starting to awaken at 5-5:30 to move bowels.  No incontinence, but near accidents.      No oral ulcers or skin issues.  He did not take methotrexate this week.  Feels that this made things worse. Would get two days of abdominal cramping and increased stools.      He has completed infliximab induction.  Last dose was 1/14/23.  During the past 8 weeks, has had two episodes where he felt like had food poisoning, where shortly after eating he had urgent and voluminous diarrhea x 1.  Then the next few days stools a bit looser and more frequent.  Although he had a partial response to Humira, I he does not feel that he has had any response to infliximab.  He felt best on Xeljanz, though he still had at least 6 stools in 24 hours and some blood.      REVIEW OF SYSTEMS    No Known Allergies    Past Medical History:   Diagnosis Date    Ulcerative colitis (CMS/[facility])        Past Surgical History:   Procedure Laterality Date    OTHER SURGICAL HISTORY  07/27/2016    Cystoscopy With Insertion Of Ureteral Stent Right    OTHER SURGICAL HISTORY  07/27/2016    Lithotripsy       Current Outpatient Medications   Medication Sig Dispense Refill    hydrocortisone (Anusol-HC) 25 mg suppository UNWRAP AND INSERT ONE SUPPOSITORY NIGHTLY FOR 3 WEEKS, THEN EVERY OTHER NIGHT FOR 3 WEEKS THEN STOP. 24 suppository 3    inFLIXimab-abda (Renflexis) injection Infuse 400 mg into a venous catheter every 8 (eight) weeks. 400 mg 0    methotrexate (Trexall) 2.5 mg tablet Take 4 tablets (10 mg total) by mouth 1 (one) time per week.  Follow directions carefully, and ask to explain any part you do not understand. Take exactly as directed. 51.429 tablet 3    potassium citrate CR (Urocit-K-15) 15 mEq ER tablet TAKE 1 TABLET BY MOUTH ONCE DAILY 90 tablet 3     Current Facility-Administered Medications   Medication Dose Route  "Frequency Provider Last Rate Last Admin    folic acid (Folvite) tablet 1 mg  1 mg oral Daily Kevin Looney MD           PHYSICAL EXAM:  /69   Pulse 72   Temp 36.7 °C (98.1 °F)   Ht 1.753 m (5' 9\")   Wt 73.5 kg (162 lb)   SpO2 98%   BMI 23.92 kg/m²   Alert in NAD  anicteric    Lab Results   Component Value Date    WBC 5.6 11/10/2023    HGB 12.9 (L) 11/10/2023    HCT 38.4 (L) 11/10/2023    MCV 83 11/10/2023     11/10/2023     Lab Results   Component Value Date    ALT 17 11/10/2023    ALT 17 11/10/2023    AST 14 11/10/2023    AST 14 11/10/2023    ALKPHOS 73 11/10/2023    ALKPHOS 73 11/10/2023    BILITOT 0.5 11/10/2023    BILITOT 0.5 11/10/2023       === 06/14/18 ===    - Impression -  1. The previously described complex cyst in the inferior pole of left  kidney likely represents 2 adjacent simple cysts.  2. 3 mm nonobstructive nephrolithiasis in the right inferior pole of  the kidney with no associated hydroureteronephrosis.  3. 7 mm hypodense lesion in the right liver, too small to further  characterize on the current examination.  I personally reviewed the images/study and I agree with the findings  as stated. This study was interpreted at [facility], [place], [place].      ASSESSMENT  #Ulcerative colitis -unfortunately, he has had no response to infliximab induction therapy.  He is already previously failed Humira and Xeljanz.  We reviewed his options extensively, which include upadacitinib, ustekinumab, risankizumab, and S1 P blockers.  At this time, I have recommended a trial of upadacitinib 45 mg once daily.  This has the advantage of being quick onset so that we can see whether he responds or not.    We will ask him to repeat some laboratory studies as well as check stool fecal calprotectin.  I did give him some samples of upadacitinib 45 mg tablets (#28) to take 1 daily.  He will make a follow-up for 8 weeks.  He will let me know in 2 weeks how he is " doing.    In addition to medical therapies, we did discuss possible surgical options for his disease.  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  He is concerned that he will not be able to do his job if he has ongoing symptoms.  He is considering surgical options and would opt for proctocolectomy with ileostomy over proctocolectomy and J-pouch at this time.    Plan:  1) stop infliximab infusions  2) check stool fecal calprotectin  3) check CBC and CRP  4) begin upadacitinib (Rinvoq) 45 mg taking 1 tablet daily.  This will be for 8 weeks.  If you respond, we will then lower the dose to 30 mg at that time.  5) contact me in 2 weeks with an update on how you are doing  6) follow-up in the office in 6 to 8 weeks    PLAN  Plan:  1) stop infliximab infusions  2) check stool fecal calprotectin  3) check CBC and CRP  4) begin upadacitinib (Rinvoq) 45 mg taking 1 tablet daily.  This will be for 8 weeks.  If you respond, we will then lower the dose to 30 mg at that time.  5) contact me in 2 weeks with an update on how you are doing  6) follow-up in the office in 6 to 8 weeks

## 2024-01-24 ENCOUNTER — LAB (OUTPATIENT)
Dept: LAB | Facility: LAB | Age: 39
End: 2024-01-24
Payer: COMMERCIAL

## 2024-01-24 DIAGNOSIS — R10.30 LOWER ABDOMINAL PAIN: ICD-10-CM

## 2024-01-24 DIAGNOSIS — K51.211 ULCERATIVE PROCTITIS WITH RECTAL BLEEDING (MULTI): ICD-10-CM

## 2024-01-24 LAB
BASOPHILS # BLD AUTO: 0.04 X10*3/UL (ref 0–0.1)
BASOPHILS NFR BLD AUTO: 0.9 %
CRP SERPL-MCNC: 0.42 MG/DL
EOSINOPHIL # BLD AUTO: 0.21 X10*3/UL (ref 0–0.7)
EOSINOPHIL NFR BLD AUTO: 4.8 %
ERYTHROCYTE [DISTWIDTH] IN BLOOD BY AUTOMATED COUNT: 12.8 % (ref 11.5–14.5)
HCT VFR BLD AUTO: 42.4 % (ref 41–52)
HGB BLD-MCNC: 14.2 G/DL (ref 13.5–17.5)
IMM GRANULOCYTES # BLD AUTO: 0.01 X10*3/UL (ref 0–0.7)
IMM GRANULOCYTES NFR BLD AUTO: 0.2 % (ref 0–0.9)
LYMPHOCYTES # BLD AUTO: 1.62 X10*3/UL (ref 1.2–4.8)
LYMPHOCYTES NFR BLD AUTO: 36.7 %
MCH RBC QN AUTO: 28.4 PG (ref 26–34)
MCHC RBC AUTO-ENTMCNC: 33.5 G/DL (ref 32–36)
MCV RBC AUTO: 85 FL (ref 80–100)
MONOCYTES # BLD AUTO: 0.56 X10*3/UL (ref 0.1–1)
MONOCYTES NFR BLD AUTO: 12.7 %
NEUTROPHILS # BLD AUTO: 1.98 X10*3/UL (ref 1.2–7.7)
NEUTROPHILS NFR BLD AUTO: 44.7 %
NRBC BLD-RTO: 0 /100 WBCS (ref 0–0)
PLATELET # BLD AUTO: 253 X10*3/UL (ref 150–450)
RBC # BLD AUTO: 5 X10*6/UL (ref 4.5–5.9)
WBC # BLD AUTO: 4.4 X10*3/UL (ref 4.4–11.3)

## 2024-01-24 PROCEDURE — 83993 ASSAY FOR CALPROTECTIN FECAL: CPT

## 2024-01-24 PROCEDURE — 86140 C-REACTIVE PROTEIN: CPT

## 2024-01-24 PROCEDURE — 85025 COMPLETE CBC W/AUTO DIFF WBC: CPT

## 2024-01-24 PROCEDURE — 36415 COLL VENOUS BLD VENIPUNCTURE: CPT

## 2024-01-26 ENCOUNTER — PHARMACY VISIT (OUTPATIENT)
Dept: PHARMACY | Facility: CLINIC | Age: 39
End: 2024-01-26
Payer: COMMERCIAL

## 2024-01-26 PROCEDURE — RXMED WILLOW AMBULATORY MEDICATION CHARGE

## 2024-01-27 LAB — CALPROTECTIN STL-MCNT: 1370 UG/G

## 2024-01-31 RX ORDER — UPADACITINIB 45 MG/1
45 TABLET, EXTENDED RELEASE ORAL DAILY
Qty: 56 TABLET | Refills: 0 | Status: SHIPPED | OUTPATIENT
Start: 2024-01-31 | End: 2024-04-10 | Stop reason: SDUPTHER

## 2024-02-02 ENCOUNTER — SPECIALTY PHARMACY (OUTPATIENT)
Dept: PHARMACY | Facility: CLINIC | Age: 39
End: 2024-02-02

## 2024-02-06 ENCOUNTER — APPOINTMENT (OUTPATIENT)
Dept: GASTROENTEROLOGY | Facility: HOSPITAL | Age: 39
End: 2024-02-06
Payer: COMMERCIAL

## 2024-02-08 ENCOUNTER — PHARMACY VISIT (OUTPATIENT)
Dept: PHARMACY | Facility: CLINIC | Age: 39
End: 2024-02-08
Payer: COMMERCIAL

## 2024-02-08 PROCEDURE — RXMED WILLOW AMBULATORY MEDICATION CHARGE

## 2024-02-12 ENCOUNTER — SPECIALTY PHARMACY (OUTPATIENT)
Dept: PHARMACY | Facility: CLINIC | Age: 39
End: 2024-02-12

## 2024-02-15 ENCOUNTER — PHARMACY VISIT (OUTPATIENT)
Dept: PHARMACY | Facility: CLINIC | Age: 39
End: 2024-02-15
Payer: COMMERCIAL

## 2024-02-15 ENCOUNTER — SPECIALTY PHARMACY (OUTPATIENT)
Dept: PHARMACY | Facility: CLINIC | Age: 39
End: 2024-02-15

## 2024-02-15 PROCEDURE — RXMED WILLOW AMBULATORY MEDICATION CHARGE

## 2024-02-16 ENCOUNTER — SPECIALTY PHARMACY (OUTPATIENT)
Dept: PHARMACY | Facility: CLINIC | Age: 39
End: 2024-02-16

## 2024-02-26 ENCOUNTER — PHARMACY VISIT (OUTPATIENT)
Dept: PHARMACY | Facility: CLINIC | Age: 39
End: 2024-02-26

## 2024-02-26 PROCEDURE — RXMED WILLOW AMBULATORY MEDICATION CHARGE

## 2024-03-01 ENCOUNTER — PHARMACY VISIT (OUTPATIENT)
Dept: PHARMACY | Facility: CLINIC | Age: 39
End: 2024-03-01
Payer: COMMERCIAL

## 2024-03-01 PROCEDURE — RXMED WILLOW AMBULATORY MEDICATION CHARGE

## 2024-03-04 ENCOUNTER — OFFICE VISIT (OUTPATIENT)
Dept: RHEUMATOLOGY | Facility: CLINIC | Age: 39
End: 2024-03-04
Payer: COMMERCIAL

## 2024-03-04 VITALS
SYSTOLIC BLOOD PRESSURE: 122 MMHG | BODY MASS INDEX: 23.64 KG/M2 | DIASTOLIC BLOOD PRESSURE: 78 MMHG | WEIGHT: 156 LBS | HEIGHT: 68 IN | TEMPERATURE: 97.9 F | HEART RATE: 69 BPM

## 2024-03-04 DIAGNOSIS — M07.60 ENTEROPATHIC ARTHRITIS: Primary | ICD-10-CM

## 2024-03-04 PROCEDURE — 99214 OFFICE O/P EST MOD 30 MIN: CPT | Performed by: INTERNAL MEDICINE

## 2024-03-04 PROCEDURE — 1036F TOBACCO NON-USER: CPT | Performed by: INTERNAL MEDICINE

## 2024-03-04 NOTE — PROGRESS NOTES
[facility] [place] Complaints    · Visit For: Other     Follow up visit.      36 yr old Syriac male with H/O ulcerative colitis, and enteropathic arthritis. is here for follow up.  Currently he is on MTX 15mg weekly.  Started in 2019 with shoulder pain followed by pain and swelling in mid foot and toe pain. Had a traumatic injury of left index finger. Saw Dr. Ramírez in 1/2020 and was started on MTX for presumed inflammatory arthritis. MTX improved his symptoms. In 12/21 was diagnosed with ulcerative colitis.   HLA-B27 negative.  Humira started in 8/22 for GI flare and arthritis   Humira was increased to weekly dose in 1/2023 but noticed no improvement and then discontinued in 6/2023.   Started on mesalamine in 3/23 and then discontinued in 6/23. Tofacitinib was started in 7/10/2023 and developed Noro virus infection and was held for 5 days. Was resumed after that. Tofacitnib was stopped in 12/2023.   Remicade started in 1/24 but discontinued because of inadequate response.      History of Present Illness: At today's visit, the patient reports being on Remicade in 1/24 but noticed worsening symptoms of his ulcerative colitis. Was started on upadacitinib 45 mg /day.   Has morning stiffness or back pain or joint pain. No rash or diarrhea.            Review of Systems    Constitutional: Denies fatigue  Head: Denies headaches or hair loss  Eyes: Denies dry eyes, blurry vision, redness of the eyes, pain in the eyes or H/O uveitis.  ENT: Denies dry mouth, loss of taste, sores in the mouth, or difficulty swallowing  Cardiovascular: Denies chest pain, palpitations  Respiratory: Denies shortness of breath or cough or wheezing  Gastrointestinal: No diarrhea.   Integumentary: Denies rash or lesions, Raynaud's or psoriasis  Neurological: Denies any numbness or tingling or weakness  MSK: As per HPI.            Active Problems  Problems    · Abdominal pain, lower (789.09) (R10.30)   · Acquired claw toe of left foot (735.5) (M20.5X2)   ·  Back pain with radiation (724.5) (M54.9)   · Bicuspid aortic valve (746.4) (Q23.1)   · Bright red blood per rectum (569.3) (K62.5)   · Cardiac murmur (785.2) (R01.1)   · Cyst of left kidney (753.10) (N28.1)   · Diarrhea of presumed infectious origin (009.3) (R19.7)   · Enteropathic arthritis (713.1) (M07.60)   · Enthesopathy (726.90) (M77.9)   · Facet arthropathy, lumbar (721.3) (M47.816)   · Finger injury (959.5) (S69.90XA)   · Foot pain, bilateral (729.5) (M79.671,M79.672)   · Hallux rigidus of both feet (735.2) (M20.21,M20.22)   · Hyperthyroidism (242.90) (E05.90)   · Immunocompromised patient (279.9) (D84.9)   · Left foot pain (729.5) (M79.672)   · Left shoulder pain (719.41) (M25.512)   · Low back pain (724.2) (M54.50)   · Myofascial pain (729.1) (M79.18)   · Nephrolithiasis (592.0) (N20.0)   · Sacroiliitis (720.2) (M46.1)   · Segmental and somatic dysfunction of upper extremity (739.7) (M99.07)   · Subclinical hyperthyroidism (242.90) (E05.90)   · Swelling of finger, left (729.81) (M79.89)   · Synovitis (727.00) (M65.9)   · 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,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)   · Vitamin D deficiency (268.9) (E55.9)     Surgical History  Problems    · History of Cystoscopy With Insertion Of Ureteral Stent Right   · History of Lithotripsy     Family History  Mother    · Family history of kidney stones (V18.69) (Z84.1)   · Family history of rheumatoid arthritis (V17.7) (Z82.61)  Father    · Family history of kidney stones (V18.69) (Z84.1)  Sister    · Family history of ulcerative colitis (V18.59) (Z83.79)  Multiple Family Members    · Family history of kidney stones (V18.69) (Z84.1)     Social History  Problems    · Full-time employment   · Never smoker   · No illicit drug use   · Recently quit alcohol use   · Single     Allergies  Medication    · No Known Drug Allergies   Recorded By: Adali Kennedy; 10/23/2014 1:07:13 PM  NonMedication    · No Known Food Allergies   Recorded By: Diana Nava;  "7/26/2022 11:25:20 AM     Current Meds     Current Outpatient Medications   Medication Sig Dispense Refill    hydrocortisone (Anusol-HC) 25 mg suppository UNWRAP AND INSERT ONE SUPPOSITORY NIGHTLY FOR 3 WEEKS, THEN EVERY OTHER NIGHT FOR 3 WEEKS THEN STOP. 24 suppository 3    potassium citrate CR (Urocit-K-15) 15 mEq ER tablet TAKE 1 TABLET BY MOUTH ONCE DAILY 90 tablet 3    upadacitinib (RINVOQ) 30 mg tablet extended release 24 hr tablet Take 1 tablet (30 mg) by mouth once daily. 90 tablet 3    upadacitinib (Rinvoq) 45 mg tablet extended release 24 hr tablet Take 1 tablet (45 mg) by mouth once daily. 56 tablet 0     No current facility-administered medications for this visit.                    Physical Exam  Blood pressure 122/78, pulse 69, temperature 36.6 °C (97.9 °F), height 1.727 m (5' 8\"), weight 70.8 kg (156 lb).  General - Alert and oriented  Head: Normocephalic, atraumatic  Eyes - PERRLA, EOMI. No conjunctiva injection.   Mouth/ENT - Moist oral and nasal mucosa.   Cardiovascular - Normal S1, S2. Regular rate and rhythm. No murmurs or rubs.  Lungs - Symmetric chest expansion. Clear to auscultation bilaterally.   Skin - No rashes or ulcers. Skin warm and dry. No erythema on bilateral cheeks.  Abdomen - Soft, non-tender. No masses. Normal bowel sounds.  Extremities - No edema, cyanosis ,or clubbing  Neurological - Alert and oriented x 3, grossly intact. No focal deficit.  Musculoskeletal -  EXAMJOINTDETAILED,  Shoulders: Full ROM, without pain, no swelling, warmth or tenderness.  Elbows: Full ROM, without pain, no swelling, warmth or tenderness.  Wrists: Full ROM, without pain, no swelling, warmth or tenderness.  MCP: No swelling, warmth or tenderness. Metacarpal squeeze negative  PIP: No swelling, warmth or tenderness.  DIP: No swelling, warmth or tenderness.  Hands : 5/5.   Sacroiliac joints: No local tenderness. HANG negative.   Hips: Full ROM. No malalignment.  Knees: Full ROM, without pain, no " swelling, warmth or point tenderness. No joint line tenderness, no pes anserine tenderness.  Back: Full range of motion  Component      Latest Ref Rng 11/10/2023 1/24/2024   WBC      4.4 - 11.3 x10*3/uL  4.4    nRBC      0.0 - 0.0 /100 WBCs  0.0    RBC      4.50 - 5.90 x10*6/uL  5.00    HEMOGLOBIN      13.5 - 17.5 g/dL  14.2    HEMATOCRIT      41.0 - 52.0 %  42.4    MCV      80 - 100 fL  85    MCH      26.0 - 34.0 pg  28.4    MCHC      32.0 - 36.0 g/dL  33.5    RED CELL DISTRIBUTION WIDTH      11.5 - 14.5 %  12.8    Platelets      150 - 450 x10*3/uL  253    Neutrophils %      40.0 - 80.0 %  44.7    Immature Granulocytes %, Automated      0.0 - 0.9 %  0.2    Lymphocytes %      13.0 - 44.0 %  36.7    Monocytes %      2.0 - 10.0 %  12.7    Eosinophils %      0.0 - 6.0 %  4.8    Basophils %      0.0 - 2.0 %  0.9    Neutrophils Absolute      1.20 - 7.70 x10*3/uL  1.98    Immature Granulocytes Absolute, Automated      0.00 - 0.70 x10*3/uL  0.01    Lymphocytes Absolute      1.20 - 4.80 x10*3/uL  1.62    Monocytes Absolute      0.10 - 1.00 x10*3/uL  0.56    Eosinophils Absolute      0.00 - 0.70 x10*3/uL  0.21    Basophils Absolute      0.00 - 0.10 x10*3/uL  0.04    Albumin      3.4 - 5.0 g/dL 4.5     Bilirubin Total      0.0 - 1.2 mg/dL 0.5     Bilirubin, Direct      0.0 - 0.3 mg/dL 0.1     Alkaline Phosphatase      33 - 120 U/L 73     ALT      10 - 52 U/L 17     AST      9 - 39 U/L 14     Total Protein      6.4 - 8.2 g/dL 7.8     C-Reactive Protein      <1.00 mg/dL  0.42          Assessment/plan: 38 yr old WM with UC and enteropathic arthritis: Continue Upadacitinib.  Needs labs.     Reviewed and approved by ADOLPH AGUILAR on 3/4/24 at 12:46 PM.                Stelara Counseling:  I discussed with the patient the risks of ustekinumab including but not limited to immunosuppression, malignancy, posterior leukoencephalopathy syndrome, and serious infections.  The patient understands that monitoring is required including a PPD at baseline and must alert us or the primary physician if symptoms of infection or other concerning signs are noted.

## 2024-03-05 ENCOUNTER — PHARMACY VISIT (OUTPATIENT)
Dept: PHARMACY | Facility: CLINIC | Age: 39
End: 2024-03-05
Payer: COMMERCIAL

## 2024-03-05 ENCOUNTER — OFFICE VISIT (OUTPATIENT)
Dept: GASTROENTEROLOGY | Facility: HOSPITAL | Age: 39
End: 2024-03-05
Payer: COMMERCIAL

## 2024-03-05 DIAGNOSIS — K51.211 ULCERATIVE PROCTITIS WITH RECTAL BLEEDING (MULTI): Primary | ICD-10-CM

## 2024-03-05 DIAGNOSIS — R10.30 LOWER ABDOMINAL PAIN: ICD-10-CM

## 2024-03-05 PROCEDURE — 99213 OFFICE O/P EST LOW 20 MIN: CPT | Performed by: INTERNAL MEDICINE

## 2024-03-05 PROCEDURE — 1036F TOBACCO NON-USER: CPT | Performed by: INTERNAL MEDICINE

## 2024-03-05 PROCEDURE — RXMED WILLOW AMBULATORY MEDICATION CHARGE

## 2024-03-05 RX ORDER — HYDROCORTISONE ACETATE 25 MG/1
25 SUPPOSITORY RECTAL DAILY
Qty: 30 SUPPOSITORY | Refills: 3 | Status: SHIPPED | OUTPATIENT
Start: 2024-03-05 | End: 2025-03-05

## 2024-03-05 NOTE — PROGRESS NOTES
"REASON FOR VISIT:  UC     HPI:  Guilherme Ocasio is a 38 y.o. male who presents for follow-up of UC.   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      -h/o bicuspid aortic valve  -nephrolithiasis      Not controlled with oral/topical 5-ASA. Started Humira in mid-8/2022.  Humira controlled all joint discomfort and improved bowel habits, but did not control bowel symptoms. 11/2022 Anser ADA 8.9 and .     12/5/22 F/S showed active Gale 2 proctitis with sharp transition to normal at 20 cm. Path chronic active colitis. Moved to weekly Humira, but never fully effective.     6/2023 Changed to tofacitinib.  Shortly after starting tofacitinib admitted with Norovirus infection. Tofacitinib held and then restarted. Continues at 10 mg BID.     9/2023 on tofa overall doing OK. Still with 4-6 stools daily. Consistency better and blood less. Stool well formed; urgency better. No nocturnal stools. Felt better than when on mesalamine and Humira. Joints are good.     9/2023 F/S still with Gale 2 proctitis.  FCP better at 198.     He thinks he had a viral illness and had some increased symptoms of diarrhea and urgency.  Did a 2 week course of hydrocortisone rectal suppositories; now using every other day.     11/2023 on average 6 stools daily.  Good day 4 and bad day 8 stools.  Blood in at least 50% of stools.  Urgency persists, better with HC suppositories.  No nocturnal stools. Usually one stool in AM with mucus/blood, then around 9-10 AM after eats has 3-4 stools.  Then rest of the day not much.  No longer doing fiber.  At 2 fiber pills did OK; if took more than that, did not help.  Stool not solid.  Loosely formed.  Decision made to change to inflximab.  However, after changing from Xeljanz to infliximab, his GI symptoms worsened, though joints better- joints are \"perfect\".  Unfortunately frequency 12-14 trips to BR in 24 hours.  4-6 stools are only mucus and blood and the rest will have mushy stool.  Blood " visible in close to every stool.  Felt better on Xeljanz, though stools still 6 daily.    Changed to upadacitinib late 1/2024.  In F/U today, doing well.  Best he has felt since diagnosis.  Stools now 4-6 daily.  Still sees a little bit of blood every day or every other day.  No nocturnal stools.  Tolerating Rinvoq without any difficulty.  Stools are usually 1-2 in AM.  Then after eats first meal 1-2 stools and then 1-2 after supper.  No pain, cramps or urgency.  Stool is formed.  No constipation.  Feels like evacuates fully.    No joint pain.      Now 6 weeks on 45 mg Rinvoq daily.  However, still using the HC suppository at bedtime.  He tried to wean off the suppository, but started to note an additional 1-2 stools daily so went back to nightly HC suppositories.    REVIEW OF SYSTEMS    No Known Allergies    Past Medical History:   Diagnosis Date    Ulcerative colitis (CMS/HCC)        Past Surgical History:   Procedure Laterality Date    OTHER SURGICAL HISTORY  07/27/2016    Cystoscopy With Insertion Of Ureteral Stent Right    OTHER SURGICAL HISTORY  07/27/2016    Lithotripsy       Current Outpatient Medications   Medication Sig Dispense Refill    hydrocortisone (Anusol-HC) 25 mg suppository UNWRAP AND INSERT ONE SUPPOSITORY NIGHTLY FOR 3 WEEKS, THEN EVERY OTHER NIGHT FOR 3 WEEKS THEN STOP. 24 suppository 3    potassium citrate CR (Urocit-K-15) 15 mEq ER tablet TAKE 1 TABLET BY MOUTH ONCE DAILY 90 tablet 3    upadacitinib (RINVOQ) 30 mg tablet extended release 24 hr tablet Take 1 tablet (30 mg) by mouth once daily. 90 tablet 3    upadacitinib (Rinvoq) 45 mg tablet extended release 24 hr tablet Take 1 tablet (45 mg) by mouth once daily. 56 tablet 0     No current facility-administered medications for this visit.       PHYSICAL EXAM:  There were no vitals taken for this visit.   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  He is about 6 weeks into therapy at 45 mg.  He is tolerating the medication without difficulty.  Joint pains are completely controlled on therapy. Our hope is that with additional time, he will continue to improve and be able to wean off of the hydrocortisone rectal suppositories.    We will go ahead and continue medication at the current dose to complete 10 weeks of therapy.  He will then lower to 30 mg daily.  Around that time, we will have him repeat labs and a stool fecal calprotectin.  If these are improved, particularly for calprotectin, we will have him try to taper hydrocortisone suppositories.    PLAN  1) continue Rinvoq to complete 10 weeks of therapy at 45 mg daily, then lowered to 30 mg daily  2) in approximately 2 weeks check labs and stool fecal calprotectin; we will let you know the results  3) if the labs and stool test are improved, we will have you taper hydrocortisone suppositories as tolerated  4) follow-up in the office in 2 months and contact me with worsening symptoms

## 2024-03-05 NOTE — PATIENT INSTRUCTIONS
As we discussed today:    PLAN  1) continue Rinvoq to complete 10 weeks of therapy at 45 mg daily, then lowered to 30 mg daily  2) in approximately 2 weeks check labs and stool fecal calprotectin; we will let you know the results  3) if the labs and stool test are improved, we will have you taper hydrocortisone suppositories as tolerated  4) follow-up in the office in 2 months and contact me with worsening symptoms

## 2024-03-07 ENCOUNTER — APPOINTMENT (OUTPATIENT)
Dept: DIALYSIS | Facility: HOSPITAL | Age: 39
End: 2024-03-07
Payer: COMMERCIAL

## 2024-03-14 DIAGNOSIS — E05.90 SUBCLINICAL HYPERTHYROIDISM: Primary | ICD-10-CM

## 2024-03-14 PROCEDURE — RXMED WILLOW AMBULATORY MEDICATION CHARGE

## 2024-03-15 ENCOUNTER — PHARMACY VISIT (OUTPATIENT)
Dept: PHARMACY | Facility: CLINIC | Age: 39
End: 2024-03-15
Payer: COMMERCIAL

## 2024-03-19 ENCOUNTER — LAB (OUTPATIENT)
Dept: LAB | Facility: LAB | Age: 39
End: 2024-03-19
Payer: COMMERCIAL

## 2024-03-19 DIAGNOSIS — K51.211 ULCERATIVE PROCTITIS WITH RECTAL BLEEDING (MULTI): ICD-10-CM

## 2024-03-19 DIAGNOSIS — K51.319 ULCERATIVE COLITIS, RECTOSIGMOID, UNSPECIFIED COMPLICATION (MULTI): ICD-10-CM

## 2024-03-19 DIAGNOSIS — R10.30 LOWER ABDOMINAL PAIN: ICD-10-CM

## 2024-03-19 DIAGNOSIS — E05.90 SUBCLINICAL HYPERTHYROIDISM: ICD-10-CM

## 2024-03-19 LAB
ALBUMIN SERPL BCP-MCNC: 4.7 G/DL (ref 3.4–5)
ALP SERPL-CCNC: 83 U/L (ref 33–120)
ALT SERPL W P-5'-P-CCNC: 15 U/L (ref 10–52)
AST SERPL W P-5'-P-CCNC: 15 U/L (ref 9–39)
BASOPHILS # BLD AUTO: 0.03 X10*3/UL (ref 0–0.1)
BASOPHILS NFR BLD AUTO: 0.5 %
BILIRUB DIRECT SERPL-MCNC: 0.1 MG/DL (ref 0–0.3)
BILIRUB SERPL-MCNC: 0.5 MG/DL (ref 0–1.2)
CHOLEST SERPL-MCNC: 166 MG/DL (ref 0–199)
CHOLESTEROL/HDL RATIO: 3
CRP SERPL-MCNC: 0.15 MG/DL
EOSINOPHIL # BLD AUTO: 0.1 X10*3/UL (ref 0–0.7)
EOSINOPHIL NFR BLD AUTO: 1.8 %
ERYTHROCYTE [DISTWIDTH] IN BLOOD BY AUTOMATED COUNT: 13 % (ref 11.5–14.5)
HCT VFR BLD AUTO: 41.3 % (ref 41–52)
HDLC SERPL-MCNC: 55.5 MG/DL
HGB BLD-MCNC: 13.7 G/DL (ref 13.5–17.5)
IMM GRANULOCYTES # BLD AUTO: 0.03 X10*3/UL (ref 0–0.7)
IMM GRANULOCYTES NFR BLD AUTO: 0.5 % (ref 0–0.9)
LDLC SERPL CALC-MCNC: 92 MG/DL
LYMPHOCYTES # BLD AUTO: 1.9 X10*3/UL (ref 1.2–4.8)
LYMPHOCYTES NFR BLD AUTO: 33.9 %
MCH RBC QN AUTO: 27.8 PG (ref 26–34)
MCHC RBC AUTO-ENTMCNC: 33.2 G/DL (ref 32–36)
MCV RBC AUTO: 84 FL (ref 80–100)
MONOCYTES # BLD AUTO: 0.69 X10*3/UL (ref 0.1–1)
MONOCYTES NFR BLD AUTO: 12.3 %
NEUTROPHILS # BLD AUTO: 2.86 X10*3/UL (ref 1.2–7.7)
NEUTROPHILS NFR BLD AUTO: 51 %
NON HDL CHOLESTEROL: 111 MG/DL (ref 0–149)
NRBC BLD-RTO: 0 /100 WBCS (ref 0–0)
PLATELET # BLD AUTO: 240 X10*3/UL (ref 150–450)
PROT SERPL-MCNC: 7.9 G/DL (ref 6.4–8.2)
RBC # BLD AUTO: 4.93 X10*6/UL (ref 4.5–5.9)
T3FREE SERPL-MCNC: 3.5 PG/ML (ref 2.3–4.2)
T4 FREE SERPL-MCNC: 1.19 NG/DL (ref 0.78–1.48)
TRIGL SERPL-MCNC: 91 MG/DL (ref 0–149)
TSH SERPL-ACNC: 0.3 MIU/L (ref 0.44–3.98)
VLDL: 18 MG/DL (ref 0–40)
WBC # BLD AUTO: 5.6 X10*3/UL (ref 4.4–11.3)

## 2024-03-19 PROCEDURE — 80061 LIPID PANEL: CPT

## 2024-03-19 PROCEDURE — 80076 HEPATIC FUNCTION PANEL: CPT

## 2024-03-19 PROCEDURE — 85025 COMPLETE CBC W/AUTO DIFF WBC: CPT

## 2024-03-19 PROCEDURE — 36415 COLL VENOUS BLD VENIPUNCTURE: CPT

## 2024-03-19 PROCEDURE — 84443 ASSAY THYROID STIM HORMONE: CPT

## 2024-03-19 PROCEDURE — 86140 C-REACTIVE PROTEIN: CPT

## 2024-03-19 PROCEDURE — 83993 ASSAY FOR CALPROTECTIN FECAL: CPT

## 2024-03-19 PROCEDURE — 84481 FREE ASSAY (FT-3): CPT

## 2024-03-19 PROCEDURE — 84439 ASSAY OF FREE THYROXINE: CPT

## 2024-03-22 LAB — CALPROTECTIN STL-MCNT: 247 UG/G

## 2024-04-07 PROCEDURE — RXMED WILLOW AMBULATORY MEDICATION CHARGE

## 2024-04-09 ENCOUNTER — LAB REQUISITION (OUTPATIENT)
Dept: LAB | Facility: LAB | Age: 39
End: 2024-04-09

## 2024-04-09 ENCOUNTER — PHARMACY VISIT (OUTPATIENT)
Dept: PHARMACY | Facility: CLINIC | Age: 39
End: 2024-04-09
Payer: COMMERCIAL

## 2024-04-09 ENCOUNTER — OFFICE VISIT (OUTPATIENT)
Dept: PRIMARY CARE | Facility: CLINIC | Age: 39
End: 2024-04-09
Payer: COMMERCIAL

## 2024-04-09 ENCOUNTER — HOSPITAL ENCOUNTER (OUTPATIENT)
Dept: RADIOLOGY | Facility: HOSPITAL | Age: 39
Discharge: HOME | End: 2024-04-09
Payer: COMMERCIAL

## 2024-04-09 DIAGNOSIS — J06.9 UPPER RESPIRATORY TRACT INFECTION, UNSPECIFIED TYPE: Primary | ICD-10-CM

## 2024-04-09 DIAGNOSIS — J06.9 UPPER RESPIRATORY TRACT INFECTION, UNSPECIFIED TYPE: ICD-10-CM

## 2024-04-09 DIAGNOSIS — R91.8 RIGHT LOWER LOBE PULMONARY INFILTRATE: ICD-10-CM

## 2024-04-09 LAB
POC BINAX EXPIRATION: NORMAL
POC BINAX NOW COVID SERIAL NUMBER: NORMAL
POC RAPID INFLUENZA A: NORMAL
POC RAPID INFLUENZA B: NORMAL
POC SARS-COV-2 AG BINAX: NORMAL
SARS-COV-2 RNA RESP QL NAA+PROBE: NOT DETECTED

## 2024-04-09 PROCEDURE — RXMED WILLOW AMBULATORY MEDICATION CHARGE

## 2024-04-09 PROCEDURE — 87635 SARS-COV-2 COVID-19 AMP PRB: CPT

## 2024-04-09 PROCEDURE — 71046 X-RAY EXAM CHEST 2 VIEWS: CPT

## 2024-04-09 PROCEDURE — 71046 X-RAY EXAM CHEST 2 VIEWS: CPT | Performed by: RADIOLOGY

## 2024-04-09 PROCEDURE — 99214 OFFICE O/P EST MOD 30 MIN: CPT | Performed by: INTERNAL MEDICINE

## 2024-04-09 RX ORDER — NIRMATRELVIR AND RITONAVIR 300-100 MG
3 KIT ORAL 2 TIMES DAILY
Qty: 30 TABLET | Refills: 0 | Status: SHIPPED | OUTPATIENT
Start: 2024-04-09 | End: 2024-04-23 | Stop reason: ALTCHOICE

## 2024-04-09 NOTE — PROGRESS NOTES
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 URI symptoms since last Thursday evening/Friday AM (4-4.5 days duration) with fever, body aches, pharyngitis, dry cough, tachycardia. No dyspnea or chest pain. Held home Rinvoq for IBD which had been previously working well. No known sick contacts or our of country travel.     Otherwise, known UC c/b enteropathic arthritis (ADALGISA Looney), failed Humira on Rinvoq with excellent GI and non-GI symptom efficacy.     Known bicuspid AV, surveillence TTEs and h/o nephrolithiasis remotely without recurrence.       O   Gen alert ill but non toxic appearing   HENT pharynx erythematous + cobblestoning   Pulm CTAB   CV tachy, regular       A/P   Guilherme is a 39 yo M presenting with 4 days of symptoms c/w likely viral etiology. Will obtain COVID, influenza swabs, start Paxlovid empirically standard dose - holding Rinvoq until feels well. If viral swabs neg, consider CXR and bacterial coverage

## 2024-04-10 ENCOUNTER — PHARMACY VISIT (OUTPATIENT)
Dept: PHARMACY | Facility: CLINIC | Age: 39
End: 2024-04-10
Payer: COMMERCIAL

## 2024-04-10 ENCOUNTER — HOSPITAL ENCOUNTER (OUTPATIENT)
Dept: RADIOLOGY | Facility: CLINIC | Age: 39
Discharge: HOME | End: 2024-04-10
Payer: COMMERCIAL

## 2024-04-10 ENCOUNTER — LAB (OUTPATIENT)
Dept: LAB | Facility: LAB | Age: 39
End: 2024-04-10
Payer: COMMERCIAL

## 2024-04-10 DIAGNOSIS — K51.211 ULCERATIVE PROCTITIS WITH RECTAL BLEEDING (MULTI): ICD-10-CM

## 2024-04-10 DIAGNOSIS — J06.9 UPPER RESPIRATORY TRACT INFECTION, UNSPECIFIED TYPE: ICD-10-CM

## 2024-04-10 DIAGNOSIS — R91.8 RIGHT LOWER LOBE PULMONARY INFILTRATE: ICD-10-CM

## 2024-04-10 LAB
ALBUMIN SERPL BCP-MCNC: 4 G/DL (ref 3.4–5)
ALP SERPL-CCNC: 67 U/L (ref 33–120)
ALT SERPL W P-5'-P-CCNC: 14 U/L (ref 10–52)
ANION GAP SERPL CALC-SCNC: 12 MMOL/L (ref 10–20)
AST SERPL W P-5'-P-CCNC: 16 U/L (ref 9–39)
BASOPHILS # BLD AUTO: 0.02 X10*3/UL (ref 0–0.1)
BASOPHILS NFR BLD AUTO: 0.4 %
BILIRUB SERPL-MCNC: 0.5 MG/DL (ref 0–1.2)
BUN SERPL-MCNC: 10 MG/DL (ref 6–23)
CALCIUM SERPL-MCNC: 9.2 MG/DL (ref 8.6–10.6)
CHLORIDE SERPL-SCNC: 103 MMOL/L (ref 98–107)
CO2 SERPL-SCNC: 29 MMOL/L (ref 21–32)
CREAT SERPL-MCNC: 0.78 MG/DL (ref 0.5–1.3)
EGFRCR SERPLBLD CKD-EPI 2021: >90 ML/MIN/1.73M*2
EOSINOPHIL # BLD AUTO: 0.02 X10*3/UL (ref 0–0.7)
EOSINOPHIL NFR BLD AUTO: 0.4 %
ERYTHROCYTE [DISTWIDTH] IN BLOOD BY AUTOMATED COUNT: 13.2 % (ref 11.5–14.5)
GLUCOSE SERPL-MCNC: 120 MG/DL (ref 74–99)
HCT VFR BLD AUTO: 36.9 % (ref 41–52)
HGB BLD-MCNC: 12.4 G/DL (ref 13.5–17.5)
IMM GRANULOCYTES # BLD AUTO: 0.03 X10*3/UL (ref 0–0.7)
IMM GRANULOCYTES NFR BLD AUTO: 0.6 % (ref 0–0.9)
LYMPHOCYTES # BLD AUTO: 0.43 X10*3/UL (ref 1.2–4.8)
LYMPHOCYTES NFR BLD AUTO: 8.6 %
MCH RBC QN AUTO: 28.5 PG (ref 26–34)
MCHC RBC AUTO-ENTMCNC: 33.6 G/DL (ref 32–36)
MCV RBC AUTO: 85 FL (ref 80–100)
MONOCYTES # BLD AUTO: 0.62 X10*3/UL (ref 0.1–1)
MONOCYTES NFR BLD AUTO: 12.4 %
NEUTROPHILS # BLD AUTO: 3.88 X10*3/UL (ref 1.2–7.7)
NEUTROPHILS NFR BLD AUTO: 77.6 %
NRBC BLD-RTO: 0 /100 WBCS (ref 0–0)
PLATELET # BLD AUTO: 188 X10*3/UL (ref 150–450)
POTASSIUM SERPL-SCNC: 3.5 MMOL/L (ref 3.5–5.3)
PROT SERPL-MCNC: 7.3 G/DL (ref 6.4–8.2)
RBC # BLD AUTO: 4.35 X10*6/UL (ref 4.5–5.9)
SODIUM SERPL-SCNC: 140 MMOL/L (ref 136–145)
WBC # BLD AUTO: 5 X10*3/UL (ref 4.4–11.3)

## 2024-04-10 PROCEDURE — RXMED WILLOW AMBULATORY MEDICATION CHARGE

## 2024-04-10 PROCEDURE — 80053 COMPREHEN METABOLIC PANEL: CPT

## 2024-04-10 PROCEDURE — 71275 CT ANGIOGRAPHY CHEST: CPT

## 2024-04-10 PROCEDURE — 2550000001 HC RX 255 CONTRASTS: Performed by: INTERNAL MEDICINE

## 2024-04-10 PROCEDURE — 71275 CT ANGIOGRAPHY CHEST: CPT | Performed by: RADIOLOGY

## 2024-04-10 PROCEDURE — 85025 COMPLETE CBC W/AUTO DIFF WBC: CPT

## 2024-04-10 PROCEDURE — 36415 COLL VENOUS BLD VENIPUNCTURE: CPT

## 2024-04-10 RX ORDER — AMOXICILLIN AND CLAVULANATE POTASSIUM 875; 125 MG/1; MG/1
875 TABLET, FILM COATED ORAL 2 TIMES DAILY
Qty: 20 TABLET | Refills: 0 | Status: SHIPPED | OUTPATIENT
Start: 2024-04-10 | End: 2024-04-23 | Stop reason: ALTCHOICE

## 2024-04-10 RX ORDER — AZITHROMYCIN 250 MG/1
TABLET, FILM COATED ORAL
Qty: 6 TABLET | Refills: 0 | Status: SHIPPED | OUTPATIENT
Start: 2024-04-10 | End: 2024-04-23 | Stop reason: ALTCHOICE

## 2024-04-10 RX ORDER — UPADACITINIB 45 MG/1
45 TABLET, EXTENDED RELEASE ORAL DAILY
Qty: 56 TABLET | Refills: 0 | Status: CANCELLED | OUTPATIENT
Start: 2024-04-10

## 2024-04-10 RX ADMIN — IOHEXOL 75 ML: 350 INJECTION, SOLUTION INTRAVENOUS at 13:36

## 2024-04-12 ENCOUNTER — DOCUMENTATION (OUTPATIENT)
Dept: GASTROENTEROLOGY | Facility: HOSPITAL | Age: 39
End: 2024-04-12
Payer: COMMERCIAL

## 2024-04-12 ENCOUNTER — LAB (OUTPATIENT)
Dept: LAB | Facility: LAB | Age: 39
End: 2024-04-12
Payer: COMMERCIAL

## 2024-04-12 DIAGNOSIS — R19.7 DIARRHEA, UNSPECIFIED TYPE: Primary | ICD-10-CM

## 2024-04-12 DIAGNOSIS — R19.7 DIARRHEA, UNSPECIFIED TYPE: ICD-10-CM

## 2024-04-12 PROCEDURE — 87493 C DIFF AMPLIFIED PROBE: CPT

## 2024-04-12 NOTE — PROGRESS NOTES
Developed RLL pneumonia.  On Augmentin and azithromycin.  Stools now watery with bloody debris.  Holding Rinvoq now and for one more week.  Will check C, difficile and try loperamide.

## 2024-04-13 LAB — C DIF TOX TCDA+TCDB STL QL NAA+PROBE: NOT DETECTED

## 2024-04-16 DIAGNOSIS — K51.211 ULCERATIVE PROCTITIS WITH RECTAL BLEEDING (MULTI): ICD-10-CM

## 2024-04-16 RX ORDER — UPADACITINIB 45 MG/1
45 TABLET, EXTENDED RELEASE ORAL DAILY
Qty: 56 TABLET | Refills: 0 | Status: CANCELLED | OUTPATIENT
Start: 2024-04-10

## 2024-04-22 NOTE — PROGRESS NOTES
REASON FOR VISIT:  ulcerative proctitis    HPI:  Guilherme Ocasio is a 38 y.o. male who presents for follow-up of A UC.   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  Seen today with wife (Jackelin) in the office     -h/o bicuspid aortic valve  -nephrolithiasis      Disease symptoms not fully controlled with oral/topical 5-ASA, Humira/methotrexate, tofacitinib, or infliximab.  Now on upadacitinib since 1/2024 and steadily improving.        In F/U today, doing well.  Best he    Last seen 3/5/24 and felt the best he had felt since diagnosis.  Stools 4-6 daily.  Was still seeing a little bit of blood every day or every other day.  No nocturnal stools.  Tolerating Rinvoq without any difficulty.  Stools are usually 1-2 in AM.  Then after eats first meal 1-2 stools and then 1-2 after supper.  No pain, cramps or urgency.  Stool was formed.  No constipation.  No joint pain.  Had completed 6 weeks on 45 mg Rinvoq daily.  However, was still using the HC suppository at bedtime.  He tried to wean off the suppository, but started to note an additional 1-2 stools daily so went back to nightly HC suppositories.    Unfortunately, he developed RLL pneumonia. CT (-) PE.  Treated w/ Augmentin and azithromycin. Developed watery stools with bloody debris 10-15 daily. Was holding Rinvoq.  Checked C. difficile and started loperamide with some improvement.    In follow-up today, not doing well.  He has been off Rinvoq last 16 days.  4 tablets of loperamide daily.  Stools 8-10 daily with blood in most stools and significant urgency.  Still using HC suppositories. No nocturnal stools, but awakens at 4-5 to move bowels.      He had fever and tachycardia with pneumonia; no SOB or fever.  Finished antibiotics on 4/19/2024.  Prior to PNA, he was down to 3-4 stools daily.      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  This makes management of symptoms a bit easier.  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  He asked whether he would be better off with a job where he works from home.  REVIEW OF SYSTEMS  Complete review of systems otherwise negative per complaint    No Known Allergies    Past Medical History:   Diagnosis Date    Ulcerative colitis (Multi)        Past Surgical History:   Procedure Laterality Date    OTHER SURGICAL HISTORY  07/27/2016    Cystoscopy With Insertion Of Ureteral Stent Right    OTHER SURGICAL HISTORY  07/27/2016    Lithotripsy       Current Outpatient Medications   Medication Sig Dispense Refill    hydrocortisone (Anusol-HC) 25 mg suppository Insert 1 suppository (25 mg) into the rectum once daily. Take prior to procedure as directed 30 suppository 3    upadacitinib (RINVOQ) 30 mg tablet extended release 24 hr tablet Take 1 tablet (30 mg) by mouth once daily. 90 tablet 3    upadacitinib (Rinvoq) 45 mg tablet extended release 24 hr tablet Take 1 tablet (45 mg) by mouth once daily. 56 tablet 0     No current facility-administered medications for this visit.       PHYSICAL EXAM:  /82   Pulse 82   Temp 36.2 °C (97.1 °F)   Resp 19   Wt 71.9 kg (158 lb 9.6 oz)   SpO2 97%   BMI 24.12 kg/m²   Vital signs reviewed  Patient alert and oriented in no acute distress  Anicteric  No cervical adenopathy  Cardiac exam regular rate and rhythm S1-S2 without murmurs gallops or rubs  Lungs clear to auscultation bilaterally; no rales or ronchi  Abdomen soft, with mild lower abd tenderness R>L without organomegaly or mass.  No rebound or guarding.  Bowel sounds present  Extremities without edema  Mood seems a bit depressed today; avoids direct eye contact      Lab Results   Component Value Date    WBC 5.0 04/10/2024    HGB 12.4 (L) 04/10/2024    HCT 36.9 (L) 04/10/2024    MCV 85 04/10/2024     04/10/2024     Lab Results   Component Value Date    ALT 14 04/10/2024    AST 16 04/10/2024    ALKPHOS 67 04/10/2024    BILITOT  0.5 04/10/2024       === 04/10/24 ===    CT ANGIO CHEST FOR PULMONARY EMBOLISM    - Impression -  No evidence of pulmonary embolism.  Right lower lobe pneumonia.    MACRO:  None    Signed by: Evans Tan 4/10/2024 1:56 PM  Dictation workstation:   [id number]      ASSESSMENT  #Ulcerative proctosigmoiditis: he was doing reasonably well on upadacitinib until he developed a right lower lobe pneumonia.  The combination of stopping upadacitinib and starting antibiotics is led to dramatically worsened symptoms with bowels going from 2-3 daily up to 8-10 daily.  He has significant urgency again.    We discussed various approaches to his illness.  He is struggling with the relapsing nature of his disease and may be a bit depressed situationally.  We discussed changing medication versus continuing with the same therapy.  He still has some mild upper respiratory congestion she will wait about 2 weeks and then restart upadacitinib at 45 mg daily.  He will continue this for about 2 weeks and then moved to 30 mg daily.    In the meantime, for symptom management we will increase his loperamide from 4 tablets to 8 tablets daily as tolerated and also add hydrocortisone enemas at bedtime in place of hydrocortisone rectal suppositories.  He will keep in touch with me once weekly and we will make treatment adjustments as needed based on his response.    PLAN  1) continue to hold upadacitinib for an additional 2 weeks and then resume therapy as long as you are feeling well  2) begin hydrocortisone enemas 1 at bedtime for 1 month and then every other night at bedtime for 1 month and then consider stopping hydrocortisone enemas based on how you are doing  3) check labs in about 2 weeks  4) reach out to me once weekly to let me know how you are doing so that we can adjust therapy as needed

## 2024-04-23 ENCOUNTER — OFFICE VISIT (OUTPATIENT)
Dept: GASTROENTEROLOGY | Facility: HOSPITAL | Age: 39
End: 2024-04-23
Payer: COMMERCIAL

## 2024-04-23 ENCOUNTER — SPECIALTY PHARMACY (OUTPATIENT)
Dept: PHARMACY | Facility: CLINIC | Age: 39
End: 2024-04-23

## 2024-04-23 VITALS
WEIGHT: 158.6 LBS | SYSTOLIC BLOOD PRESSURE: 142 MMHG | OXYGEN SATURATION: 97 % | HEART RATE: 82 BPM | RESPIRATION RATE: 19 BRPM | BODY MASS INDEX: 24.12 KG/M2 | TEMPERATURE: 97.1 F | DIASTOLIC BLOOD PRESSURE: 82 MMHG

## 2024-04-23 DIAGNOSIS — K51.211 ULCERATIVE PROCTITIS WITH RECTAL BLEEDING (MULTI): Primary | ICD-10-CM

## 2024-04-23 DIAGNOSIS — K51.211 ULCERATIVE PROCTITIS WITH RECTAL BLEEDING (MULTI): ICD-10-CM

## 2024-04-23 PROCEDURE — 99214 OFFICE O/P EST MOD 30 MIN: CPT | Performed by: INTERNAL MEDICINE

## 2024-04-23 PROCEDURE — RXMED WILLOW AMBULATORY MEDICATION CHARGE

## 2024-04-23 RX ORDER — HYDROCORTISONE 100 MG/60ML
100 SUSPENSION RECTAL NIGHTLY
Qty: 1800 ML | Refills: 1 | Status: SHIPPED | OUTPATIENT
Start: 2024-04-23 | End: 2024-06-11 | Stop reason: SDUPTHER

## 2024-04-23 RX ORDER — UPADACITINIB 45 MG/1
45 TABLET, EXTENDED RELEASE ORAL DAILY
Qty: 56 TABLET | Refills: 0 | Status: SHIPPED | OUTPATIENT
Start: 2024-04-23

## 2024-04-23 ASSESSMENT — PAIN SCALES - GENERAL: PAINLEVEL: 0-NO PAIN

## 2024-04-23 NOTE — PATIENT INSTRUCTIONS
Thank you for coming in for follow-up today.  I am glad that you are feeling better from the pneumonia but unfortunately your proctosigmoiditis is gotten worse off of Rinvoq therapy and on the antibiotics.  As we discussed, we will increase the loperamide and try hydrocortisone enemas to see if we can get symptom control while we wait an additional 2 weeks before starting back on Rinvoq again.  As discussed today:    PLAN  1) continue to hold upadacitinib for an additional 2 weeks and then resume therapy as long as you are feeling well  2) begin hydrocortisone enemas 1 at bedtime for 1 month and then every other night at bedtime for 1 month and then consider stopping hydrocortisone enemas based on how you are doing  3) increase loperamide from 1 tablet 4 times daily to up to 2 tablets 4 times daily to see if this helps improve symptoms; adjust dose as needed  3) check labs in about 2 weeks  4) reach out to me once weekly to let me know how you are doing so that we can adjust therapy as needed

## 2024-04-26 ENCOUNTER — PHARMACY VISIT (OUTPATIENT)
Dept: PHARMACY | Facility: CLINIC | Age: 39
End: 2024-04-26
Payer: COMMERCIAL

## 2024-04-26 PROCEDURE — RXOTC WILLOW AMBULATORY OTC CHARGE

## 2024-05-02 ENCOUNTER — HOSPITAL ENCOUNTER (OUTPATIENT)
Dept: DIALYSIS | Facility: HOSPITAL | Age: 39
Setting detail: DIALYSIS SERIES
End: 2024-05-02
Payer: COMMERCIAL

## 2024-05-03 ENCOUNTER — LAB (OUTPATIENT)
Dept: LAB | Facility: LAB | Age: 39
End: 2024-05-03
Payer: COMMERCIAL

## 2024-05-03 DIAGNOSIS — K51.211 ULCERATIVE PROCTITIS WITH RECTAL BLEEDING (MULTI): ICD-10-CM

## 2024-05-03 LAB
BASOPHILS # BLD AUTO: 0.02 X10*3/UL (ref 0–0.1)
BASOPHILS NFR BLD AUTO: 0.4 %
CRP SERPL-MCNC: 1 MG/DL
EOSINOPHIL # BLD AUTO: 0.16 X10*3/UL (ref 0–0.7)
EOSINOPHIL NFR BLD AUTO: 3.1 %
ERYTHROCYTE [DISTWIDTH] IN BLOOD BY AUTOMATED COUNT: 13 % (ref 11.5–14.5)
HCT VFR BLD AUTO: 35.1 % (ref 41–52)
HGB BLD-MCNC: 11.8 G/DL (ref 13.5–17.5)
IMM GRANULOCYTES # BLD AUTO: 0.02 X10*3/UL (ref 0–0.7)
IMM GRANULOCYTES NFR BLD AUTO: 0.4 % (ref 0–0.9)
LYMPHOCYTES # BLD AUTO: 1.24 X10*3/UL (ref 1.2–4.8)
LYMPHOCYTES NFR BLD AUTO: 24.4 %
MCH RBC QN AUTO: 28.3 PG (ref 26–34)
MCHC RBC AUTO-ENTMCNC: 33.6 G/DL (ref 32–36)
MCV RBC AUTO: 84 FL (ref 80–100)
MONOCYTES # BLD AUTO: 0.7 X10*3/UL (ref 0.1–1)
MONOCYTES NFR BLD AUTO: 13.8 %
NEUTROPHILS # BLD AUTO: 2.95 X10*3/UL (ref 1.2–7.7)
NEUTROPHILS NFR BLD AUTO: 57.9 %
NRBC BLD-RTO: 0 /100 WBCS (ref 0–0)
PLATELET # BLD AUTO: 253 X10*3/UL (ref 150–450)
RBC # BLD AUTO: 4.17 X10*6/UL (ref 4.5–5.9)
WBC # BLD AUTO: 5.1 X10*3/UL (ref 4.4–11.3)

## 2024-05-03 PROCEDURE — 85025 COMPLETE CBC W/AUTO DIFF WBC: CPT

## 2024-05-03 PROCEDURE — 36415 COLL VENOUS BLD VENIPUNCTURE: CPT

## 2024-05-03 PROCEDURE — 86140 C-REACTIVE PROTEIN: CPT

## 2024-05-08 PROCEDURE — RXMED WILLOW AMBULATORY MEDICATION CHARGE

## 2024-05-09 ENCOUNTER — SPECIALTY PHARMACY (OUTPATIENT)
Dept: PHARMACY | Facility: CLINIC | Age: 39
End: 2024-05-09

## 2024-05-09 PROCEDURE — RXMED WILLOW AMBULATORY MEDICATION CHARGE

## 2024-05-10 ENCOUNTER — PHARMACY VISIT (OUTPATIENT)
Dept: PHARMACY | Facility: CLINIC | Age: 39
End: 2024-05-10
Payer: COMMERCIAL

## 2024-05-13 ENCOUNTER — PHARMACY VISIT (OUTPATIENT)
Dept: PHARMACY | Facility: CLINIC | Age: 39
End: 2024-05-13
Payer: COMMERCIAL

## 2024-05-15 ENCOUNTER — SPECIALTY PHARMACY (OUTPATIENT)
Dept: PHARMACY | Facility: CLINIC | Age: 39
End: 2024-05-15

## 2024-05-15 NOTE — PROGRESS NOTES
[facility] Clinical Note    Guilherme Ocasio is a 38 y.o. male, who is on the specialty pharmacy service of: [facility].  Guilherme Ocasio is taking: Rinvoq.     Guilherme was contacted on 5/15/2024.    Refer to the encounter summary report for documentation details about patient counseling and education.      Impression/Plan  Is patient high risk? (potential patients:  pregnancy, geriatric, pediatric) No    Is laboratory follow-up needed? No  Is a clinical intervention needed?  No  Next assessment date?  ~11/15/2024  Additional comments:    Medication Adherence  The importance of adherence was discussed with the patient and they were advised to take the medication as prescribed by their provider. Guilherme was encouraged to call his physician's office if they have a question regarding a missed dose.        Patient advised to contact the pharmacy if there are any changes to her medication list, including prescriptions, OTC medications, herbal products, or supplements. Patient was advised of [facility]’s dispensing process, refill timeline, contact information ([phone]), and patient management follow up. Patient confirmed understanding of education conducted during assessment. All patient questions and concerns were addressed to the best of my ability. Patient was encouraged to contact the specialty pharmacy with any questions or concerns.    Confirmed follow-up outreaches are properly scheduled. Reviewed goals of therapy in the program targets.    Philomena Lowe, BakariD

## 2024-05-23 ENCOUNTER — TELEMEDICINE (OUTPATIENT)
Dept: PRIMARY CARE | Facility: CLINIC | Age: 39
End: 2024-05-23
Payer: COMMERCIAL

## 2024-05-23 ENCOUNTER — PHARMACY VISIT (OUTPATIENT)
Dept: PHARMACY | Facility: CLINIC | Age: 39
End: 2024-05-23
Payer: COMMERCIAL

## 2024-05-23 ENCOUNTER — APPOINTMENT (OUTPATIENT)
Dept: PRIMARY CARE | Facility: CLINIC | Age: 39
End: 2024-05-23
Payer: COMMERCIAL

## 2024-05-23 DIAGNOSIS — H02.89 PAIN AND SWELLING OF EYELID OF RIGHT EYE: Primary | ICD-10-CM

## 2024-05-23 DIAGNOSIS — H02.843 PAIN AND SWELLING OF EYELID OF RIGHT EYE: Primary | ICD-10-CM

## 2024-05-23 PROCEDURE — 99212 OFFICE O/P EST SF 10 MIN: CPT | Performed by: FAMILY MEDICINE

## 2024-05-23 PROCEDURE — RXMED WILLOW AMBULATORY MEDICATION CHARGE

## 2024-05-23 RX ORDER — ERYTHROMYCIN 5 MG/G
OINTMENT OPHTHALMIC 4 TIMES DAILY
Qty: 3.5 G | Refills: 0 | Status: SHIPPED | OUTPATIENT
Start: 2024-05-23 | End: 2024-05-30

## 2024-05-23 NOTE — PROGRESS NOTES
I performed this visit using realtime telehealth tools, including an audio/video OR telephone connection between the patient listed who was located in the [facility] [place] and myself, Felicia Gotti (Board certified in the [facility]).  At the start of the visit, I introduced myself as Dr. Harris and verified the patients name, , and current physical location.    If they were currently outside of the state of [place], the visit was ended and the patient was referred to alternative means for evaluation and treatment.   The patient was made aware of the limitations of the telehealth visit.  They will not be physically examined and all issues may not be appropriate for a telehealth visit.  If necessary, an in person referral will be made.      DISCLAIMER:   In preparing for this visit and writing this note, I reviewed previous electronic medical records (labs, imaging and medical charts) of the patient available in the physician portal. Significant findings which helped in decision making are recorded in this encounter charting.     At the completion of the visit, the plan was discussed with the patient.  All questions were answered the patient verbalized understanding.     All allergies were reviewed as well as reconciliation of all medications.      Woke up Monday with swelling of right upper eyelid  No pain or vision changes  Swelling got better yesterday but back today to what it was initially on Monday  The majority of the eye lid is swollen  Not tenderness along last line  If presses on eyelid--sl TTP all over not just one spot  No scratches to eyelid  No fever chills aches  No N,V  No URI sxs  +diarrhea--baseline for patient  No trauma to eye/eyelid  No glasses or contacts  No pain with EOM  No rash on eyelid--Skin feels normal  No dry skin or eczema  Has just used warm compresses--nothing else topical  No drainage from eye or eyelid  Not itchy or burning  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     At the completion of the visit, possible diagnoses and plan was discussed with the patient as well as recommended treatments, medications prescribed, and when to follow up for in person evaluation. All questions were answered and the patient verbalized understanding.

## 2024-05-24 ENCOUNTER — OFFICE VISIT (OUTPATIENT)
Dept: OPHTHALMOLOGY | Facility: CLINIC | Age: 39
End: 2024-05-24
Payer: COMMERCIAL

## 2024-05-24 ENCOUNTER — PHARMACY VISIT (OUTPATIENT)
Dept: PHARMACY | Facility: CLINIC | Age: 39
End: 2024-05-24
Payer: COMMERCIAL

## 2024-05-24 DIAGNOSIS — H00.011 HORDEOLUM EXTERNUM OF RIGHT UPPER EYELID: Primary | ICD-10-CM

## 2024-05-24 PROCEDURE — 99203 OFFICE O/P NEW LOW 30 MIN: CPT | Performed by: OPHTHALMOLOGY

## 2024-05-24 PROCEDURE — RXMED WILLOW AMBULATORY MEDICATION CHARGE

## 2024-05-24 RX ORDER — AMOXICILLIN AND CLAVULANATE POTASSIUM 875; 125 MG/1; MG/1
875 TABLET, FILM COATED ORAL 2 TIMES DAILY
Qty: 14 TABLET | Refills: 0 | Status: SHIPPED | OUTPATIENT
Start: 2024-05-24 | End: 2024-05-29 | Stop reason: ALTCHOICE

## 2024-05-24 ASSESSMENT — SLIT LAMP EXAM - LIDS: COMMENTS: NORMAL

## 2024-05-24 ASSESSMENT — VISUAL ACUITY
OS_SC: 20/20-1
METHOD: SNELLEN - LINEAR
OD_SC: 20/25+2

## 2024-05-24 ASSESSMENT — EXTERNAL EXAM - LEFT EYE: OS_EXAM: NORMAL

## 2024-05-24 ASSESSMENT — EXTERNAL EXAM - RIGHT EYE: OD_EXAM: NORMAL

## 2024-05-24 NOTE — PROGRESS NOTES
Assessment/Plan   Diagnoses and all orders for this visit:  Hordeolum externum of right upper eyelid  Swelling upper lid (UL) OD started Monday  Currently on erythro marielos qhs and warm compresses  Wants to rule out orbital cellulitis given he has ulcerative colitis  Low concern for preseptal cellulitis  Continue as above and may start augmentin if no improvement  -     amoxicillin-pot clavulanate (Augmentin) 875-125 mg tablet; Take 1 tablet (875 mg) by mouth 2 times a day for 7 days.

## 2024-05-25 NOTE — PATIENT INSTRUCTIONS
Stye vs cellulitis of eyelid-- no obvious lump felt or seen  Erythromycin ointment to eyelid 4 x a day for 7 days  Follow up in person if any eye pain, increased area of redness or swelling, vision changes etc    Please send me a [employer] message if you have any questions or concerns.  FOR NON URGENT questions only.  Allow up to 72 hours for response.    If you have prescription issues or other questions you can email   Clara Justice  Digital Health Coordinator, at   [email or web address]     Rest and drink plenty of fluids    Tylenol and or motrin as needed for pain and fever (unless you have been told not to take these because of your personal medical history)    Discussed options and precautions (complaint specific and may include)  Viral versus bacterial infection; use of medications; possible side effects; appropriate over-the-counter medications; possible complications and /or when to follow-up.    Follow-up in 1 to 2 days if not improving.  Follow-up immediately if symptoms worsen.    All red flags requiring in person care were discussed.  All patient's questions were answered.    To connect with a new PCP please visit [email or web address] or call [phone]     If experiencing any severe or worsening symptoms including but not limited to lethargy / chest pain / weakness / dizziness / difficulty breathing please call 911 or go to the emergency department for immediate care!    Limitations to telemedicine include inability to do a complete and accurate physical exam.  Any concerns regarding this were conveyed with the patient and in person follow-up recommended if patient nature of illness does not progress as anticipated during this visit.    CDC updated Respiratory Infection guidelines:   When you have a respiratory virus, stay home and away from others (including people  you live with who are not sick) Symptoms can include fever, chills, fatigue, cough,  runny  nose, and headache (and others).    You can go back to your normal activities when,   for at least 24 hours,   BOTH are true:    1) Your symptoms are getting better overall  2) You have not had a fever (and are not using fever-reducing medication).    When you go back to your normal activities, take added precaution over the next 5 days, such as taking additional steps for  air, hygiene, masks, physical distancing, and/or testing when you will be around other people indoors.    Keep in mind that you may still be able to spread the virus that made you sick, even if you are feeling better. You are likely to be less contagious at this time, depending on factors like how long you were sick or how sick you were.    If you develop a fever or you start to feel worse after you have gone back to normal activities, stay home and away from others again until, for at least 24 hours, both are true: your symptoms are improving overall, and you have not had a fever (and are not using fever-reducing medication). Then take added precaution for the next 5 days.    If you never had symptoms but tested positive for a respiratory virus?, you may be contagious. For the next 5 days: take added precaution, such as taking additional steps for  air, hygiene, masks, physical distancing, and/or testing when you will be around other people indoors. This is especially important to protect people with factors that increase their risk of severe illness from respiratory viruses.  Avoid immunocompromised, elderly, pregnant women, infants etc    Have a low threshold for in person evaluation if your symptoms worsen.

## 2024-05-28 DIAGNOSIS — K51.211 ULCERATIVE PROCTITIS WITH RECTAL BLEEDING (MULTI): Primary | ICD-10-CM

## 2024-05-29 ENCOUNTER — HOSPITAL ENCOUNTER (OUTPATIENT)
Dept: RADIOLOGY | Facility: HOSPITAL | Age: 39
Discharge: HOME | End: 2024-05-29
Payer: COMMERCIAL

## 2024-05-29 ENCOUNTER — TELEMEDICINE (OUTPATIENT)
Dept: ENDOCRINOLOGY | Facility: CLINIC | Age: 39
End: 2024-05-29
Payer: COMMERCIAL

## 2024-05-29 DIAGNOSIS — E04.2 MULTINODULAR GOITER: ICD-10-CM

## 2024-05-29 DIAGNOSIS — K51.211 ULCERATIVE PROCTITIS WITH RECTAL BLEEDING (MULTI): ICD-10-CM

## 2024-05-29 DIAGNOSIS — E05.90 SUBCLINICAL HYPERTHYROIDISM: Primary | ICD-10-CM

## 2024-05-29 PROCEDURE — 1036F TOBACCO NON-USER: CPT | Performed by: INTERNAL MEDICINE

## 2024-05-29 PROCEDURE — 74022 RADEX COMPL AQT ABD SERIES: CPT | Performed by: RADIOLOGY

## 2024-05-29 PROCEDURE — 99213 OFFICE O/P EST LOW 20 MIN: CPT | Performed by: INTERNAL MEDICINE

## 2024-05-29 PROCEDURE — 74022 RADEX COMPL AQT ABD SERIES: CPT

## 2024-05-29 ASSESSMENT — ENCOUNTER SYMPTOMS
FEVER: 0
VOMITING: 0
NAUSEA: 0
DIZZINESS: 0
DIARRHEA: 0
CHILLS: 0
LIGHT-HEADEDNESS: 0
SHORTNESS OF BREATH: 0

## 2024-05-29 NOTE — PROGRESS NOTES
Endocrinology: Follow up visit  Subjective   Patient ID: Guilherme Ocasio is a 38 y.o. male who presents for Hyperthyroidism.    PCP: John Domínguez MD    HPI  I performed this visit using real-time telehealth tools, including an audio/video connection between the patient at their home and Dr Oly Carmichael [place], [place].     Last seen a year ago.  Completing residency in a few weeks but staying at .  Had severe bout of URI and continued issues with UC.   No palpitations.   Recent thyroid levels improved    Review of Systems   Constitutional:  Negative for chills and fever.   Respiratory:  Negative for shortness of breath.    Gastrointestinal:  Negative for diarrhea, nausea and vomiting.   Endocrine: Negative for cold intolerance and heat intolerance.   Neurological:  Negative for dizziness and light-headedness.       Patient Active Problem List   Diagnosis    Acquired claw toe of left foot    Bicuspid aortic valve determined by imaging ([facility])    Bright red blood per rectum    Cardiac murmur    Diarrhea of presumed infectious origin    Enteropathic arthritis    Facet arthropathy, lumbar    Finger injury    Hallux rigidus of both feet    Hemangioma of skin and subcutaneous tissue    Immunocompromised patient (Multi)    Melanocytic nevi of scalp and neck    Cyst of left kidney    Other hypertrophic disorders of the skin    Sacroiliitis (CMS-HCC)    Enthesopathy    Subclinical hyperthyroidism    Swelling of finger, left    Synovitis    Ulcerative colitis, rectosigmoid, unspecified complication (Multi)    Ulcerative proctitis with rectal bleeding (Multi)    Vitamin D deficiency        Home Meds:  Current Outpatient Medications   Medication Instructions    amoxicillin-pot clavulanate (Augmentin) 875-125 mg tablet 875 mg, oral, 2 times daily    erythromycin (Romycin) 5 mg/gram (0.5 %) ophthalmic ointment ophthalmic (eye), 4 times daily, Apply Amount per Dose: 0.25 inch (~0.5 cm) per dose.    hydrocortisone  (ANUSOL-HC) 25 mg, rectal, Daily, Take prior to procedure as directed    hydrocortisone (CORTENEMA) 100 mg, rectal, Nightly, Use as directed    mupirocin (Bactroban) 2 % ointment Topical, 3 times daily    Rinvoq 45 mg, oral, Daily    upadacitinib (RINVOQ) 30 mg, oral, Daily        No Known Allergies     Objective   There were no vitals filed for this visit.   There were no vitals filed for this visit.   There is no height or weight on file to calculate BMI.   Physical Exam    Labs:  Lab Results   Component Value Date    TSH 0.30 (L) 03/19/2024    FREET4 1.19 03/19/2024      Lab Results   Component Value Date    THYROIDPAB 15 02/05/2020    TSI <1.0 02/05/2020        Assessment/Plan   Problem List Items Addressed This Visit       Subclinical hyperthyroidism - Primary    Relevant Orders    Thyroid Stimulating Hormone    Thyroxine, Free    Triiodothyronine, Free     Other Visit Diagnoses       Multinodular goiter              Discussed course.   Recent thyroid levels shows improved/stable subclinical hyperthyroidism.  Imaging/uptake in the past unremarkable except for nodules  Ok to follow up in one year.  Last us 2021: discussed repeat in 3-5 yrs, he would like to put off to next year which is reasonable      Electronically signed by:  lOy Carmichael MD 05/29/24 5:16 PM

## 2024-05-29 NOTE — PATIENT INSTRUCTIONS
Follow up in one year for labs and ultrasound  Please call or message with any concerns or questions

## 2024-06-03 PROCEDURE — RXMED WILLOW AMBULATORY MEDICATION CHARGE

## 2024-06-05 PROCEDURE — RXMED WILLOW AMBULATORY MEDICATION CHARGE

## 2024-06-06 ENCOUNTER — PHARMACY VISIT (OUTPATIENT)
Dept: PHARMACY | Facility: CLINIC | Age: 39
End: 2024-06-06
Payer: COMMERCIAL

## 2024-06-10 NOTE — PROGRESS NOTES
REASON FOR VISIT:  ulcerative proctitis    HPI:  Guilherme Ocasio is a 38 y.o. male who presents for follow-up.  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       -h/o bicuspid aortic valve  -nephrolithiasis      Disease symptoms not fully controlled with oral/topical 5-ASA, Humira/methotrexate, tofacitinib, or infliximab.  On upadacitinib since 1/2024 and was steadily improving with stools 4-6 daily.  Was still seeing a little bit of blood every day or every other day.  No nocturnal stools.  Tolerating Rinvoq without any difficulty.      Had completed 6 weeks on 45 mg Rinvoq daily.  However, was still using the HC suppository at bedtime.  He tried to wean off the suppository, but started to note an additional 1-2 stools daily so went back to nightly HC suppositories.  Unfortunately, he developed RLL pneumonia. CT (-) PE.  Treated w/ Augmentin and azithromycin. Developed watery stools with bloody debris 10-15 daily. Was holding Rinvoq.  Checked C. difficile and started loperamide with some improvement.     Last seen 4/23/24 and had been off Rinvoq last 16 days.  Stools 8-10 daily with blood in most stools and significant urgency.  Still using HC suppositories. No nocturnal stools, but awakens at 4-5 to move bowels.  Finished antibiotics on 4/19/2024.  Prior to PNA, he was down to 3-4 stools daily.  Started on HC enemas and restarted upadacitinib.  Did not tolerate enemas well but has continued.    In follow-up today, overall doing better.  Stools down to 6-8 in 24 hours.  Stools formed.  Stools 3-4 stools in the morning.  2 in day and then 1-2 with small amount of blood and mucus in evening.  Changes started in the past week.  Over the past week, he has been able to retain the enema all night.  Before that couldn't retain enema very long. Only seeing blood 1-2 daily in the evening.  Stool thin or flat, but formed.  Urgency is better.  Has 1-2 minutes before needs to stool.  He has been back on 45 mg  Rinvoq for 6 weeks.      Sleeping better.  Finishes training in weeks and then has 4 weeks off before starts job.  He is using two tablets of methylcellulose at night.  No cramps or pain.      REVIEW OF SYSTEMS    No Known Allergies    Past Medical History:   Diagnosis Date    Ulcerative colitis (Multi)        Past Surgical History:   Procedure Laterality Date    OTHER SURGICAL HISTORY  07/27/2016    Cystoscopy With Insertion Of Ureteral Stent Right    OTHER SURGICAL HISTORY  07/27/2016    Lithotripsy       Current Outpatient Medications   Medication Sig Dispense Refill    hydrocortisone (Anusol-HC) 25 mg suppository Insert 1 suppository (25 mg) into the rectum once daily. Take prior to procedure as directed 30 suppository 3    hydrocortisone (Cortenema) 100 mg/60 mL enema Insert 1 enema (100 mg) into the rectum once daily at bedtime. Use as directed 1800 mL 1    upadacitinib (RINVOQ) 30 mg tablet extended release 24 hr tablet Take 1 tablet (30 mg) by mouth once daily. 90 tablet 3    upadacitinib (Rinvoq) 45 mg tablet extended release 24 hr tablet Take 1 tablet (45 mg) by mouth once daily. 56 tablet 0     No current facility-administered medications for this visit.       PHYSICAL EXAM:  /79   Pulse 69   Temp 36.7 °C (98 °F)   Resp 16   Wt 72.6 kg (160 lb 1.6 oz)   SpO2 98%   BMI 24.34 kg/m²   Anicteric  Alert no acute distress  No abdominal pain    Lab Results   Component Value Date    WBC 5.1 05/03/2024    HGB 11.8 (L) 05/03/2024    HCT 35.1 (L) 05/03/2024    MCV 84 05/03/2024     05/03/2024     Lab Results   Component Value Date    ALT 14 04/10/2024    AST 16 04/10/2024    ALKPHOS 67 04/10/2024    BILITOT 0.5 04/10/2024       === 04/10/24 ===    CT ANGIO CHEST FOR PULMONARY EMBOLISM    - Impression -  No evidence of pulmonary embolism.  Right lower lobe pneumonia.    MACRO:  None    Signed by: Evans Tan 4/10/2024 1:56 PM  Dictation workstation:   [id number]      ASSESSMENT  # Ulcerative  proctitis-he is making slow progress on combination of upadacitinib and hydrocortisone enemas.  He was off the enemas for a while as he could not retain them, but after about a month of restarting upadacitinib, he began to be able to retain the enemas again.  He is slowly improving.  Stools are again formed.  Blood and mucus are minimal.  Urgency is improved.    We will give him an additional month at 45 mg of upadacitinib and then plan to drop to 30 mg daily.  He will continue hydrocortisone enemas nightly for an additional 10 days and then, [place], moved to every other night enemas for a month.  He will follow-up in mid to late July.  He will call with any issues.  Labs will be monitored.    PLAN  1) continue upadacitinib 45 mg once daily  2) continue hydrocortisone enemas 1 at bedtime for the next 10 to 14 days and then, prior to traveling, shift to every other night enemas as tolerated  3) check labs  4) in case we need to start etrasimod, check an EKG  5) follow-up in mid to late July and contact the office with any change in condition

## 2024-06-11 ENCOUNTER — OFFICE VISIT (OUTPATIENT)
Dept: GASTROENTEROLOGY | Facility: HOSPITAL | Age: 39
End: 2024-06-11
Payer: COMMERCIAL

## 2024-06-11 VITALS
RESPIRATION RATE: 16 BRPM | DIASTOLIC BLOOD PRESSURE: 79 MMHG | SYSTOLIC BLOOD PRESSURE: 130 MMHG | BODY MASS INDEX: 24.34 KG/M2 | WEIGHT: 160.1 LBS | OXYGEN SATURATION: 98 % | TEMPERATURE: 98 F | HEART RATE: 69 BPM

## 2024-06-11 DIAGNOSIS — K51.211 ULCERATIVE PROCTITIS WITH RECTAL BLEEDING (MULTI): Primary | ICD-10-CM

## 2024-06-11 PROCEDURE — 99213 OFFICE O/P EST LOW 20 MIN: CPT | Performed by: INTERNAL MEDICINE

## 2024-06-11 RX ORDER — HYDROCORTISONE 100 MG/60ML
100 SUSPENSION RECTAL NIGHTLY
Qty: 1680 ML | Refills: 1 | Status: SHIPPED | OUTPATIENT
Start: 2024-06-11 | End: 2024-07-11

## 2024-06-11 RX ORDER — METHYLCELLULOSE 500 MG/1
2 TABLET ORAL NIGHTLY
COMMUNITY

## 2024-06-11 ASSESSMENT — PAIN SCALES - GENERAL: PAINLEVEL: 0-NO PAIN

## 2024-06-11 NOTE — PATIENT INSTRUCTIONS
I am glad that you feel a bit better.  As reviewed today,    PLAN  1) continue upadacitinib 45 mg once daily  2) continue hydrocortisone enemas 1 at bedtime for the next 10 to 14 days and then, prior to traveling, shift to every other night enemas as tolerated  3) check labs  4) in case we need to start etrasimod, check an EKG  5) follow-up in mid to late July and contact the office with any change in condition

## 2024-06-17 ENCOUNTER — SPECIALTY PHARMACY (OUTPATIENT)
Dept: PHARMACY | Facility: CLINIC | Age: 39
End: 2024-06-17

## 2024-06-17 ENCOUNTER — HOSPITAL ENCOUNTER (OUTPATIENT)
Dept: CARDIOLOGY | Facility: HOSPITAL | Age: 39
Discharge: HOME | End: 2024-06-17
Payer: COMMERCIAL

## 2024-06-17 ENCOUNTER — LAB (OUTPATIENT)
Dept: LAB | Facility: LAB | Age: 39
End: 2024-06-17
Payer: COMMERCIAL

## 2024-06-17 DIAGNOSIS — K51.211 ULCERATIVE PROCTITIS WITH RECTAL BLEEDING (MULTI): ICD-10-CM

## 2024-06-17 LAB
CRP SERPL-MCNC: <0.1 MG/DL
ERYTHROCYTE [DISTWIDTH] IN BLOOD BY AUTOMATED COUNT: 12.6 % (ref 11.5–14.5)
FERRITIN SERPL-MCNC: 16 NG/ML (ref 20–300)
HCT VFR BLD AUTO: 37.5 % (ref 41–52)
HGB BLD-MCNC: 12.5 G/DL (ref 13.5–17.5)
IRON SATN MFR SERPL: 13 % (ref 25–45)
IRON SERPL-MCNC: 47 UG/DL (ref 35–150)
MCH RBC QN AUTO: 28.6 PG (ref 26–34)
MCHC RBC AUTO-ENTMCNC: 33.3 G/DL (ref 32–36)
MCV RBC AUTO: 86 FL (ref 80–100)
NRBC BLD-RTO: 0 /100 WBCS (ref 0–0)
PLATELET # BLD AUTO: 258 X10*3/UL (ref 150–450)
RBC # BLD AUTO: 4.37 X10*6/UL (ref 4.5–5.9)
TIBC SERPL-MCNC: 351 UG/DL (ref 240–445)
UIBC SERPL-MCNC: 304 UG/DL (ref 110–370)
WBC # BLD AUTO: 4.6 X10*3/UL (ref 4.4–11.3)

## 2024-06-17 PROCEDURE — 83540 ASSAY OF IRON: CPT

## 2024-06-17 PROCEDURE — 85027 COMPLETE CBC AUTOMATED: CPT

## 2024-06-17 PROCEDURE — 93005 ELECTROCARDIOGRAM TRACING: CPT

## 2024-06-17 PROCEDURE — 83550 IRON BINDING TEST: CPT

## 2024-06-17 PROCEDURE — 36415 COLL VENOUS BLD VENIPUNCTURE: CPT

## 2024-06-17 PROCEDURE — 82728 ASSAY OF FERRITIN: CPT

## 2024-06-17 PROCEDURE — 86140 C-REACTIVE PROTEIN: CPT

## 2024-06-17 PROCEDURE — 93010 ELECTROCARDIOGRAM REPORT: CPT | Performed by: INTERNAL MEDICINE

## 2024-06-18 LAB
ATRIAL RATE: 62 BPM
P AXIS: 60 DEGREES
P OFFSET: 198 MS
P ONSET: 143 MS
PR INTERVAL: 148 MS
Q ONSET: 217 MS
QRS COUNT: 10 BEATS
QRS DURATION: 118 MS
QT INTERVAL: 426 MS
QTC CALCULATION(BAZETT): 432 MS
QTC FREDERICIA: 430 MS
R AXIS: 57 DEGREES
T AXIS: 41 DEGREES
T OFFSET: 430 MS
VENTRICULAR RATE: 62 BPM

## 2024-06-19 PROCEDURE — RXMED WILLOW AMBULATORY MEDICATION CHARGE

## 2024-06-20 ENCOUNTER — PHARMACY VISIT (OUTPATIENT)
Dept: PHARMACY | Facility: CLINIC | Age: 39
End: 2024-06-20
Payer: COMMERCIAL

## 2024-06-24 PROCEDURE — RXMED WILLOW AMBULATORY MEDICATION CHARGE

## 2024-06-25 ENCOUNTER — PHARMACY VISIT (OUTPATIENT)
Dept: PHARMACY | Facility: CLINIC | Age: 39
End: 2024-06-25
Payer: COMMERCIAL

## 2024-07-23 ENCOUNTER — SPECIALTY PHARMACY (OUTPATIENT)
Dept: PHARMACY | Facility: CLINIC | Age: 39
End: 2024-07-23

## 2024-07-26 DIAGNOSIS — K51.211 ULCERATIVE PROCTITIS WITH RECTAL BLEEDING (MULTI): ICD-10-CM

## 2024-07-26 PROCEDURE — RXMED WILLOW AMBULATORY MEDICATION CHARGE

## 2024-07-26 RX ORDER — HYDROCORTISONE ACETATE 25 MG/1
25 SUPPOSITORY RECTAL DAILY
Qty: 30 SUPPOSITORY | Refills: 3 | Status: SHIPPED | OUTPATIENT
Start: 2024-07-26 | End: 2025-07-26

## 2024-07-27 ENCOUNTER — PHARMACY VISIT (OUTPATIENT)
Dept: PHARMACY | Facility: CLINIC | Age: 39
End: 2024-07-27
Payer: COMMERCIAL

## 2024-07-29 NOTE — PROGRESS NOTES
REASON FOR VISIT:  UC    HPI:  Guilherme Ocasio is a 38 y.o. male who presents for follow-up.  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       -h/o bicuspid aortic valve  -nephrolithiasis    9/2024 flex sig with flores 2 colitis to distal sigmoid.      Currently on upadacitinib (started 1/2024, but induction interrupted with PNA) and topical steroids.  Last seen in early June.  Stools down to 6-8 in 24 hours. Stools formed. Stools 3-4 stools in the morning. 2 in day and then 1-2 with small amount of blood and mucus in evening.  Was using 2 tabs of methylcellulose daily.      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    In follow-up today, he traveled to [facility] 6/25-7/25/24.  While there was on 45 mg daily.  While there, did very poorly.  Stools >10 daily.  Stools started out with little blood, then mucus, then by end of the day gale blood.  Waking up 1 time a night to move bowels.  Lots of urgency, severe tenesmus and rectal pain.  No incontinence, but wearing pads.    About one week ago lowered upadacitinib to 30 mg daily.      No joint symptoms.  (+) oral ulceration (1) now    Tried HC enemas, but couldn't hold them.  Since return, he has used HC suppositories the past few days at 4 PM and then able to use enema at night.    REVIEW OF SYSTEMS  Complete review of systems otherwise negative per complaint    No Known Allergies    Past Medical History:   Diagnosis Date    Ulcerative colitis (Multi)        Past Surgical History:   Procedure Laterality Date    OTHER SURGICAL HISTORY  07/27/2016    Cystoscopy With Insertion Of Ureteral Stent Right    OTHER SURGICAL HISTORY  07/27/2016    Lithotripsy       Current Outpatient Medications   Medication Sig Dispense Refill    hydrocortisone (Anusol-HC) 25 mg suppository Insert 1 suppository (25 mg) into the rectum once daily. Take prior to procedure as directed 30 suppository 3    hydrocortisone (Cortenema)  "100 mg/60 mL enema Insert 1 enema (100 mg) into the rectum once daily at bedtime. Use as directed 1680 mL 1    methylcellulose, laxative, (CitruceL) 500 mg tablet Take 2 tablets (1,000 mg) by mouth once daily at bedtime.      upadacitinib (RINVOQ) 30 mg tablet extended release 24 hr tablet Take 1 tablet (30 mg) by mouth once daily. 90 tablet 3    upadacitinib (Rinvoq) 45 mg tablet extended release 24 hr tablet Take 1 tablet (45 mg) by mouth once daily. 56 tablet 0     No current facility-administered medications for this visit.       PHYSICAL EXAM:  /80   Pulse 81   Temp 36 °C (96.8 °F)   Ht 1.727 m (5' 8\")   Wt 72.6 kg (160 lb)   SpO2 98%   BMI 24.33 kg/m²   Alert in NAD  Anicteric  Abdomen soft and non-tender    Lab Results   Component Value Date    WBC 4.6 06/17/2024    HGB 12.5 (L) 06/17/2024    HCT 37.5 (L) 06/17/2024    MCV 86 06/17/2024     06/17/2024     Lab Results   Component Value Date    ALT 14 04/10/2024    AST 16 04/10/2024    ALKPHOS 67 04/10/2024    BILITOT 0.5 04/10/2024       === 04/10/24 ===    CT ANGIO CHEST FOR PULMONARY EMBOLISM    - Impression -  No evidence of pulmonary embolism.  Right lower lobe pneumonia.    MACRO:  None    Signed by: Evans Tan 4/10/2024 1:56 PM  Dictation workstation:   [id number]      ASSESSMENT  #UC/UP- unfortunately, he is losing response to upadacitinib.  Options reviewed: he did not respond to Humira or Remicade; partial response to Xeljanz and partial response to Rinvoq.  Unable to stay well on hydrocortisone enemas, mesalamine enemas, or mesalamine suppositories.  At this time, I have suggested a trial of Atreza mild given its reported of efficacy specifically in patients with ulcerative proctitis.      At this time, however, he is significantly symptomatic we will go ahead and trial prednisone.  Although prednisone sometimes is not effective in people with proctitis, he does seem to respond to topical steroids and I am hopeful that he will " respond to systemic steroids.  This would be a short-term trial.  We did review risk including but not limited to the risk of mood changes, weight gain, sleep disruption, and avascular necrosis.    If the trazodone is not effective, I would suggest proceeding with repeat sigmoidoscopy and then interleukin-12/23 or interleukin-23 inhibitor.  He will follow-up in 4 weeks and communicate with me in 1 week before then to let me know if prednisone helping and to plan taper.    PLAN  1) check stool studies  2) check CBC and CRP  3) prednisone 40 mg (4 tabs) once daily for one week; call/email/EPIC chat Dr. Looney in one week and let him know if helping and will discuss taper at that time  4) we will get approval for etrasimod (Velsipity) to try for disease treatment  5) stop upadacitinib  6) follow-up in 4 weeks

## 2024-07-30 ENCOUNTER — PHARMACY VISIT (OUTPATIENT)
Dept: PHARMACY | Facility: CLINIC | Age: 39
End: 2024-07-30
Payer: COMMERCIAL

## 2024-07-30 ENCOUNTER — LAB (OUTPATIENT)
Dept: LAB | Facility: LAB | Age: 39
End: 2024-07-30
Payer: COMMERCIAL

## 2024-07-30 ENCOUNTER — OFFICE VISIT (OUTPATIENT)
Dept: GASTROENTEROLOGY | Facility: HOSPITAL | Age: 39
End: 2024-07-30
Payer: COMMERCIAL

## 2024-07-30 ENCOUNTER — SPECIALTY PHARMACY (OUTPATIENT)
Dept: PHARMACY | Facility: CLINIC | Age: 39
End: 2024-07-30

## 2024-07-30 VITALS
SYSTOLIC BLOOD PRESSURE: 142 MMHG | WEIGHT: 160 LBS | HEART RATE: 81 BPM | DIASTOLIC BLOOD PRESSURE: 80 MMHG | TEMPERATURE: 96.8 F | BODY MASS INDEX: 24.25 KG/M2 | HEIGHT: 68 IN | OXYGEN SATURATION: 98 %

## 2024-07-30 DIAGNOSIS — K51.211 ULCERATIVE PROCTITIS WITH RECTAL BLEEDING (MULTI): ICD-10-CM

## 2024-07-30 DIAGNOSIS — K51.211 ULCERATIVE PROCTITIS WITH RECTAL BLEEDING (MULTI): Primary | ICD-10-CM

## 2024-07-30 LAB
BASOPHILS # BLD AUTO: 0.03 X10*3/UL (ref 0–0.1)
BASOPHILS NFR BLD AUTO: 0.4 %
C COLI+JEJ+UPSA DNA STL QL NAA+PROBE: NOT DETECTED
CRP SERPL-MCNC: 1.38 MG/DL
EC STX1 GENE STL QL NAA+PROBE: NOT DETECTED
EC STX2 GENE STL QL NAA+PROBE: NOT DETECTED
EOSINOPHIL # BLD AUTO: 0.01 X10*3/UL (ref 0–0.7)
EOSINOPHIL NFR BLD AUTO: 0.1 %
ERYTHROCYTE [DISTWIDTH] IN BLOOD BY AUTOMATED COUNT: 12.8 % (ref 11.5–14.5)
HCT VFR BLD AUTO: 37.2 % (ref 41–52)
HGB BLD-MCNC: 12.6 G/DL (ref 13.5–17.5)
IMM GRANULOCYTES # BLD AUTO: 0.34 X10*3/UL (ref 0–0.7)
IMM GRANULOCYTES NFR BLD AUTO: 5 % (ref 0–0.9)
LYMPHOCYTES # BLD AUTO: 1.42 X10*3/UL (ref 1.2–4.8)
LYMPHOCYTES NFR BLD AUTO: 20.9 %
MCH RBC QN AUTO: 27.6 PG (ref 26–34)
MCHC RBC AUTO-ENTMCNC: 33.9 G/DL (ref 32–36)
MCV RBC AUTO: 82 FL (ref 80–100)
MONOCYTES # BLD AUTO: 0.85 X10*3/UL (ref 0.1–1)
MONOCYTES NFR BLD AUTO: 12.5 %
NEUTROPHILS # BLD AUTO: 4.14 X10*3/UL (ref 1.2–7.7)
NEUTROPHILS NFR BLD AUTO: 61.1 %
NOROVIRUS GI + GII RNA STL NAA+PROBE: NOT DETECTED
NRBC BLD-RTO: 0 /100 WBCS (ref 0–0)
PLATELET # BLD AUTO: 262 X10*3/UL (ref 150–450)
RBC # BLD AUTO: 4.56 X10*6/UL (ref 4.5–5.9)
RV RNA STL NAA+PROBE: NOT DETECTED
SALMONELLA DNA STL QL NAA+PROBE: NOT DETECTED
SHIGELLA DNA SPEC QL NAA+PROBE: NOT DETECTED
V CHOLERAE DNA STL QL NAA+PROBE: NOT DETECTED
WBC # BLD AUTO: 6.8 X10*3/UL (ref 4.4–11.3)
Y ENTEROCOL DNA STL QL NAA+PROBE: NOT DETECTED

## 2024-07-30 PROCEDURE — 1036F TOBACCO NON-USER: CPT | Performed by: INTERNAL MEDICINE

## 2024-07-30 PROCEDURE — 83993 ASSAY FOR CALPROTECTIN FECAL: CPT | Performed by: INTERNAL MEDICINE

## 2024-07-30 PROCEDURE — RXMED WILLOW AMBULATORY MEDICATION CHARGE

## 2024-07-30 PROCEDURE — 99214 OFFICE O/P EST MOD 30 MIN: CPT | Performed by: INTERNAL MEDICINE

## 2024-07-30 PROCEDURE — 87506 IADNA-DNA/RNA PROBE TQ 6-11: CPT | Performed by: INTERNAL MEDICINE

## 2024-07-30 PROCEDURE — 86140 C-REACTIVE PROTEIN: CPT

## 2024-07-30 PROCEDURE — 3008F BODY MASS INDEX DOCD: CPT | Performed by: INTERNAL MEDICINE

## 2024-07-30 PROCEDURE — 36415 COLL VENOUS BLD VENIPUNCTURE: CPT

## 2024-07-30 PROCEDURE — 85025 COMPLETE CBC W/AUTO DIFF WBC: CPT

## 2024-07-30 RX ORDER — PREDNISONE 10 MG/1
TABLET ORAL
Qty: 100 TABLET | Refills: 12 | Status: SHIPPED | OUTPATIENT
Start: 2024-07-30 | End: 2024-08-29

## 2024-07-30 RX ORDER — POTASSIUM CITRATE 15 MEQ/1
15 TABLET, EXTENDED RELEASE ORAL DAILY
COMMUNITY

## 2024-07-30 ASSESSMENT — PAIN SCALES - GENERAL: PAINLEVEL: 2

## 2024-07-30 NOTE — PATIENT INSTRUCTIONS
We will try prednisone and etrasimod to see if we can improve your disease activity.  As discussed today:      PLAN  1) check stool studies  2) check CBC and CRP  3) prednisone 40 mg (4 tabs) once daily for one week; call/email/EPIC chat Dr. Looney in one week and let him know if helping and will discuss taper at that time  4) we will get approval for etrasimod (Velsipity) to try for disease treatment  5) stop upadacitinib  6) follow-up in 4 weeks

## 2024-08-02 LAB — CALPROTECTIN STL-MCNT: >3000 UG/G

## 2024-08-23 ENCOUNTER — PHARMACY VISIT (OUTPATIENT)
Dept: PHARMACY | Facility: CLINIC | Age: 39
End: 2024-08-23
Payer: COMMERCIAL

## 2024-08-23 PROCEDURE — RXMED WILLOW AMBULATORY MEDICATION CHARGE

## 2024-08-26 NOTE — PROGRESS NOTES
REASON FOR VISIT:  ulcerative colitis    HPI:  Guilherme Ocasio is a 38 y.o. male who presents for follow-up.  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       -h/o bicuspid aortic valve  -nephrolithiasis     9/2024 flex sig with flores 2 colitis to distal sigmoid.       Difficult to control disease.  Has tried and failed Humira, Remicade, Entyvio, tofacitinib, and upadacitinib (started 1/2024, but induction interrupted with PNA) and topical steroids.      Seen 1 month ago and not doing well after travel to [facility] in June.  Decision made to give a course of prednisone and transition to etrasimod 2 mg daily.    In follow-up today, now at prednisone 30 mg daily and etrasimod 2 mg daily (has had 17 doses).  Symptoms better.  Stools around 6 daily.  Stool mostly formed, but flat.  Still with visible blood in stool about twice daily.  No nocturnal stools, so sleeping better.  Still with episodes of explosive diarrhea here and there.  Also with 1-2 episodes of just mucus and gas.  He has 4-5 of the trips to the  by 10-11 AM.  Still using HC rectal suppository at bedtime.  Does think that slowly is improving- each week slightly better than the last.    Son has hand-foot-mouth disease.  Son with fever and starting to get better.    REVIEW OF SYSTEMS    No Known Allergies    Past Medical History:   Diagnosis Date    Ulcerative colitis (Multi)        Past Surgical History:   Procedure Laterality Date    OTHER SURGICAL HISTORY  07/27/2016    Cystoscopy With Insertion Of Ureteral Stent Right    OTHER SURGICAL HISTORY  07/27/2016    Lithotripsy       Current Outpatient Medications   Medication Sig Dispense Refill    hydrocortisone (Anusol-HC) 25 mg suppository Insert 1 suppository (25 mg) into the rectum once daily. Take prior to procedure as directed 30 suppository 3    hydrocortisone (Cortenema) 100 mg/60 mL enema Insert 1 enema (100 mg) into the rectum once daily at bedtime. Use as directed 1680 mL 1     methylcellulose, laxative, (CitruceL) 500 mg tablet Take 2 tablets (1,000 mg) by mouth once daily at bedtime.      potassium citrate CR (Urocit-K-15) 15 mEq ER tablet Take 1 tablet (15 mEq) by mouth once daily.      predniSONE (Deltasone) 10 mg tablet Take 4 tablets by mouth once daily in the morning for 14 days then lower by 1/2 tablet every week until off of prednisone 100 tablet 12    upadacitinib (RINVOQ) 30 mg tablet extended release 24 hr tablet Take 1 tablet (30 mg) by mouth once daily. 90 tablet 3     No current facility-administered medications for this visit.       PHYSICAL EXAM:  /85   Pulse 78   Temp 36.4 °C (97.5 °F)   Wt 72.3 kg (159 lb 8 oz)   SpO2 97%   BMI 24.25 kg/m²   Alert in NAD  anicteric    Lab Results   Component Value Date    WBC 6.8 07/30/2024    HGB 12.6 (L) 07/30/2024    HCT 37.2 (L) 07/30/2024    MCV 82 07/30/2024     07/30/2024     Lab Results   Component Value Date    ALT 14 04/10/2024    AST 16 04/10/2024    ALKPHOS 67 04/10/2024    BILITOT 0.5 04/10/2024       === 04/10/24 ===    CT ANGIO CHEST FOR PULMONARY EMBOLISM    - Impression -  No evidence of pulmonary embolism.  Right lower lobe pneumonia.    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  He did prednisone 40 mg daily for 2 weeks and has since been lowering by 5 mg every week.  Etrasimod for 2-1/2 weeks.  Does think slowly improving.    Will continue steroid taper and follow-up again in 4 weeks (9/24/24) when will be at 10 mg prednisone.  Will check labs and calprotectin ahead of next appointment.    #hand-foot-mouth- advised to wear a mask around son until son recovers and wash hands well    PLAN  1) continue to taper prednisone by 5 mg every week  2) continue Etrasimod 2 mg daily  3) follow-up in the office in 4 weeks  (9/24/2024)  4) approximately 1 week before follow-up, repeat labs and stool fecal calprotectin  5) contact Dr. Looney if symptoms worsening prior to follow-up  6) until son is improving from hand-foot-and-mouth disease, recommend wearing a mask, not touching any skin lesions, and washing hands well after contact

## 2024-08-27 ENCOUNTER — OFFICE VISIT (OUTPATIENT)
Dept: GASTROENTEROLOGY | Facility: HOSPITAL | Age: 39
End: 2024-08-27
Payer: COMMERCIAL

## 2024-08-27 VITALS
OXYGEN SATURATION: 97 % | TEMPERATURE: 97.5 F | HEART RATE: 78 BPM | BODY MASS INDEX: 24.25 KG/M2 | WEIGHT: 159.5 LBS | DIASTOLIC BLOOD PRESSURE: 85 MMHG | SYSTOLIC BLOOD PRESSURE: 129 MMHG

## 2024-08-27 DIAGNOSIS — K51.211 ULCERATIVE PROCTITIS WITH RECTAL BLEEDING (MULTI): Primary | ICD-10-CM

## 2024-08-27 PROCEDURE — 99213 OFFICE O/P EST LOW 20 MIN: CPT | Performed by: INTERNAL MEDICINE

## 2024-08-27 PROCEDURE — 1036F TOBACCO NON-USER: CPT | Performed by: INTERNAL MEDICINE

## 2024-08-27 SDOH — ECONOMIC STABILITY: FOOD INSECURITY: WITHIN THE PAST 12 MONTHS, YOU WORRIED THAT YOUR FOOD WOULD RUN OUT BEFORE YOU GOT MONEY TO BUY MORE.: NEVER TRUE

## 2024-08-27 SDOH — ECONOMIC STABILITY: FOOD INSECURITY: WITHIN THE PAST 12 MONTHS, THE FOOD YOU BOUGHT JUST DIDN'T LAST AND YOU DIDN'T HAVE MONEY TO GET MORE.: NEVER TRUE

## 2024-08-27 ASSESSMENT — PATIENT HEALTH QUESTIONNAIRE - PHQ9
1. LITTLE INTEREST OR PLEASURE IN DOING THINGS: NOT AT ALL
SUM OF ALL RESPONSES TO PHQ9 QUESTIONS 1 & 2: 0
2. FEELING DOWN, DEPRESSED OR HOPELESS: NOT AT ALL

## 2024-08-27 ASSESSMENT — LIFESTYLE VARIABLES
HOW MANY STANDARD DRINKS CONTAINING ALCOHOL DO YOU HAVE ON A TYPICAL DAY: PATIENT DOES NOT DRINK
HOW OFTEN DO YOU HAVE SIX OR MORE DRINKS ON ONE OCCASION: NEVER
AUDIT-C TOTAL SCORE: 0
SKIP TO QUESTIONS 9-10: 1
HOW OFTEN DO YOU HAVE A DRINK CONTAINING ALCOHOL: NEVER

## 2024-08-27 ASSESSMENT — PAIN SCALES - GENERAL: PAINLEVEL: 0-NO PAIN

## 2024-08-27 NOTE — PATIENT INSTRUCTIONS
Glad you are feeling a bit better.  We will continue current medication and see if symptoms continue to improve the longer you are on the trazodone.  As reviewed today:    PLAN  1) continue to taper prednisone by 5 mg every week  2) continue Etrasimod 2 mg daily  3) follow-up in the office in 4 weeks (9/24/2024)  4) approximately 1 week before follow-up, repeat labs and stool fecal calprotectin  5) contact Dr. Looney if symptoms worsening prior to follow-up  6) until son is improving from hand-foot-and-mouth disease, recommend wearing a mask, not touching any skin lesions, and washing hands well after contact

## 2024-08-28 ENCOUNTER — TELEPHONE (OUTPATIENT)
Dept: GASTROENTEROLOGY | Facility: HOSPITAL | Age: 39
End: 2024-08-28
Payer: COMMERCIAL

## 2024-08-29 ENCOUNTER — APPOINTMENT (OUTPATIENT)
Dept: PRIMARY CARE | Facility: CLINIC | Age: 39
End: 2024-08-29
Payer: COMMERCIAL

## 2024-09-03 ENCOUNTER — APPOINTMENT (OUTPATIENT)
Dept: OPHTHALMOLOGY | Facility: CLINIC | Age: 39
End: 2024-09-03
Payer: COMMERCIAL

## 2024-09-09 ENCOUNTER — APPOINTMENT (OUTPATIENT)
Dept: RHEUMATOLOGY | Facility: CLINIC | Age: 39
End: 2024-09-09
Payer: COMMERCIAL

## 2024-09-17 ENCOUNTER — LAB (OUTPATIENT)
Dept: LAB | Facility: LAB | Age: 39
End: 2024-09-17
Payer: COMMERCIAL

## 2024-09-17 DIAGNOSIS — K51.211 ULCERATIVE PROCTITIS WITH RECTAL BLEEDING (MULTI): ICD-10-CM

## 2024-09-17 LAB
ALBUMIN SERPL BCP-MCNC: 4.3 G/DL (ref 3.4–5)
ALP SERPL-CCNC: 66 U/L (ref 33–120)
ALT SERPL W P-5'-P-CCNC: 21 U/L (ref 10–52)
AST SERPL W P-5'-P-CCNC: 13 U/L (ref 9–39)
BASOPHILS # BLD AUTO: 0.01 X10*3/UL (ref 0–0.1)
BASOPHILS NFR BLD AUTO: 0.3 %
BILIRUB DIRECT SERPL-MCNC: 0.1 MG/DL (ref 0–0.3)
BILIRUB SERPL-MCNC: 0.6 MG/DL (ref 0–1.2)
CRP SERPL-MCNC: 0.13 MG/DL
EOSINOPHIL # BLD AUTO: 0.05 X10*3/UL (ref 0–0.7)
EOSINOPHIL NFR BLD AUTO: 1.3 %
ERYTHROCYTE [DISTWIDTH] IN BLOOD BY AUTOMATED COUNT: 13.1 % (ref 11.5–14.5)
HCT VFR BLD AUTO: 40.8 % (ref 41–52)
HGB BLD-MCNC: 13.3 G/DL (ref 13.5–17.5)
IMM GRANULOCYTES # BLD AUTO: 0.02 X10*3/UL (ref 0–0.7)
IMM GRANULOCYTES NFR BLD AUTO: 0.5 % (ref 0–0.9)
LYMPHOCYTES # BLD AUTO: 0.4 X10*3/UL (ref 1.2–4.8)
LYMPHOCYTES NFR BLD AUTO: 10.1 %
MCH RBC QN AUTO: 28.1 PG (ref 26–34)
MCHC RBC AUTO-ENTMCNC: 32.6 G/DL (ref 32–36)
MCV RBC AUTO: 86 FL (ref 80–100)
MONOCYTES # BLD AUTO: 0.61 X10*3/UL (ref 0.1–1)
MONOCYTES NFR BLD AUTO: 15.4 %
NEUTROPHILS # BLD AUTO: 2.86 X10*3/UL (ref 1.2–7.7)
NEUTROPHILS NFR BLD AUTO: 72.4 %
NRBC BLD-RTO: 0 /100 WBCS (ref 0–0)
PLATELET # BLD AUTO: 217 X10*3/UL (ref 150–450)
PROT SERPL-MCNC: 7.2 G/DL (ref 6.4–8.2)
RBC # BLD AUTO: 4.74 X10*6/UL (ref 4.5–5.9)
WBC # BLD AUTO: 4 X10*3/UL (ref 4.4–11.3)

## 2024-09-17 PROCEDURE — 83993 ASSAY FOR CALPROTECTIN FECAL: CPT

## 2024-09-17 PROCEDURE — 36415 COLL VENOUS BLD VENIPUNCTURE: CPT

## 2024-09-17 PROCEDURE — 85025 COMPLETE CBC W/AUTO DIFF WBC: CPT

## 2024-09-17 PROCEDURE — 86140 C-REACTIVE PROTEIN: CPT

## 2024-09-17 PROCEDURE — 80076 HEPATIC FUNCTION PANEL: CPT

## 2024-09-20 LAB — CALPROTECTIN STL-MCNT: 570 UG/G

## 2024-09-23 ENCOUNTER — APPOINTMENT (OUTPATIENT)
Dept: RHEUMATOLOGY | Facility: CLINIC | Age: 39
End: 2024-09-23
Payer: COMMERCIAL

## 2024-09-23 VITALS
SYSTOLIC BLOOD PRESSURE: 136 MMHG | DIASTOLIC BLOOD PRESSURE: 77 MMHG | BODY MASS INDEX: 23.95 KG/M2 | HEART RATE: 82 BPM | HEIGHT: 68 IN | WEIGHT: 158 LBS

## 2024-09-23 DIAGNOSIS — M07.60 ENTEROPATHIC ARTHRITIS: ICD-10-CM

## 2024-09-23 PROCEDURE — 1036F TOBACCO NON-USER: CPT | Performed by: INTERNAL MEDICINE

## 2024-09-23 PROCEDURE — 99214 OFFICE O/P EST MOD 30 MIN: CPT | Performed by: INTERNAL MEDICINE

## 2024-09-23 PROCEDURE — 3008F BODY MASS INDEX DOCD: CPT | Performed by: INTERNAL MEDICINE

## 2024-09-23 ASSESSMENT — PAIN SCALES - GENERAL: PAINLEVEL: 0-NO PAIN

## 2024-09-23 NOTE — PROGRESS NOTES
REASON FOR VISIT:  ulcerative colitis    HPI:  Guilherme Ocasio is a 38 y.o. male who presents for follow-up.  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       -h/o bicuspid aortic valve  -nephrolithiasis     9/2024 flex sig with flores 2 colitis to distal sigmoid.       Difficult to control disease.  Has tried and failed Humira, Remicade, Entyvio, tofacitinib, and upadacitinib (started 1/2024, but induction interrupted with PNA) and topical steroids.      7/2024 seen and doing well after travel to [facility] in June.  Decision made to give a course of prednisone and transition to etrasimod 2 mg daily.    Last seen 4 weeks ago (8/27/24 at prednisone 30 mg daily and etrasimod 2 mg daily (has had 17 doses).  Symptoms better.  Stools around 6 daily.  Stool mostly formed, but flat.  Still with visible blood in stool about twice daily.  No nocturnal stools, so sleeping better.  Still with episodes of explosive diarrhea here and there.  Also with 1-2 episodes of just mucus and gas.  He has 4-5 of the trips to the  by 10-11 AM.  Still using HC rectal suppository at bedtime.  Does think that slowly is improving- each week slightly better than the last.    9/17/24  ; better but still elevated.    In follow-up today, prednisone now at 10 mg daily.  Last week stools could be as good as 1-3 daily with no blood.  This was at 15 mg daily.  Now seeing blood rarely, every few days.  Stools vary from 3-6 daily.  Stool still formed, but the greater the frequency, then the looser stools get.  Still using hydrocortisone most nights.    Energy is better.  No nocturnal stools.  Still urgency with just 1-2 minutes to get to bathroom.      Joints are fine    REVIEW OF SYSTEMS    No Known Allergies    Past Medical History:   Diagnosis Date    Ulcerative colitis (Multi)        Past Surgical History:   Procedure Laterality Date    OTHER SURGICAL HISTORY  07/27/2016    Cystoscopy With Insertion Of Ureteral Stent Right    OTHER  "SURGICAL HISTORY  07/27/2016    Lithotripsy       Current Outpatient Medications   Medication Sig Dispense Refill    etrasimod 2 mg tablet Take 2 mg by mouth once daily.      hydrocortisone (Anusol-HC) 25 mg suppository Insert 1 suppository (25 mg) into the rectum once daily. Take prior to procedure as directed 30 suppository 3    hydrocortisone (Cortenema) 100 mg/60 mL enema Insert 1 enema (100 mg) into the rectum once daily at bedtime. Use as directed (Patient not taking: Reported on 8/27/2024) 1680 mL 1    methylcellulose, laxative, (CitruceL) 500 mg tablet Take 2 tablets (1,000 mg) by mouth once daily at bedtime.      potassium citrate CR (Urocit-K-15) 15 mEq ER tablet Take 1 tablet (15 mEq) by mouth once daily.      predniSONE (Deltasone) 10 mg tablet Take 4 tablets by mouth once daily in the morning for 14 days then lower by 1/2 tablet every week until off of prednisone 100 tablet 12     No current facility-administered medications for this visit.       PHYSICAL EXAM:  /79   Pulse 78   Temp 36.6 °C (97.9 °F)   Ht 1.727 m (5' 8\")   Wt 71.7 kg (158 lb)   SpO2 98%   BMI 24.02 kg/m²   Alert in NAD  anicteric    Lab Results   Component Value Date    WBC 4.0 (L) 09/17/2024    HGB 13.3 (L) 09/17/2024    HCT 40.8 (L) 09/17/2024    MCV 86 09/17/2024     09/17/2024     Lab Results   Component Value Date    ALT 21 09/17/2024    AST 13 09/17/2024    ALKPHOS 66 09/17/2024    BILITOT 0.6 09/17/2024 9/17/2024 Fecal calprotectin 570    === 04/10/24 ===    CT ANGIO CHEST FOR PULMONARY EMBOLISM    - Impression -  No evidence of pulmonary embolism.  Right lower lobe pneumonia.    MACRO:  None    Signed by: Evans Tan 4/10/2024 1:56 PM  Dictation workstation:   [id number]      ASSESSMENT  #UC-generally overall clinically better on Etrasimod therapy.  Prednisone down from 30 mg daily to 10 mg daily.  Recently, with the dose reduction from 15 to 10 mg daily he has had some increased in frequency, but " continues with minimal blood in stool is formed.    We will go ahead and slowly taper off prednisone lowering by 5 mg every 2 weeks as tolerated.  Will plan for repeat flexible sigmoidoscopy.  He will see me back in a month in the office and we will do the sigmoidoscopy in mid November.  He will let us know if symptoms worsen as he tapers off prednisone.    PLAN  1) continue to taper prednisone; day at 10 mg prednisone daily for 1 additional week, then lowered to 5 mg daily as tolerated for 2 weeks, then stop prednisone  2) continue Etrasimod 2 mg daily  3) follow-up in the office in 4 weeks as scheduled  4) plan for flexible sigmoidoscopy 11/15/24 as scheduled  5) contact Dr. Looney if symptoms worsening prior to follow-up

## 2024-09-23 NOTE — PROGRESS NOTES
36 yr old [place] male with H/O ulcerative colitis, and enteropathic arthritis. is here for follow up.  Currently he is on MTX 15mg weekly.  Started in 2019 with shoulder pain followed by pain and swelling in mid foot and toe pain. Had a traumatic injury of left index finger. Saw Dr. Ramírez in 1/2020 and was started on MTX for presumed inflammatory arthritis. MTX improved his symptoms. In 12/21 was diagnosed with ulcerative colitis.   HLA-B27 negative.  Humira started in 8/22 for GI flare and arthritis   Humira was increased to weekly dose in 1/2023 but noticed no improvement and then discontinued in 6/2023.   Started on mesalamine in 3/23 and then discontinued in 6/23. Tofacitinib was started in 7/10/2023 and developed Noro virus infection and was held for 5 days. Was resumed after that. Tofacitnib was stopped in 12/2023.   Remicade started in 1/24 but discontinued because of inadequate response.    Was started on upadacitinib 45 mg /day and developed pneumonia. Discontinued upadacitnib and started on Etrasimod for UC    Chief Complaint: Management of enteropathic arthritis    History of Present Illness: At today's visit, the patient reports starting Etrasimod 6 weeks ago.   Has no morning stiffness or back pain or joint pain. No rash but has diarrhea.            Review of Systems    Constitutional: Denies fatigue  Head: Denies headaches or hair loss  Eyes: Denies dry eyes, blurry vision, redness of the eyes, pain in the eyes or H/O uveitis.  ENT: Denies dry mouth, loss of taste, sores in the mouth, or difficulty swallowing  Cardiovascular: Denies chest pain, palpitations  Respiratory: Denies shortness of breath or cough or wheezing  Gastrointestinal: No diarrhea.   Integumentary: Denies rash or lesions, Raynaud's or psoriasis  Neurological: Denies any numbness or tingling or weakness  MSK: As per HPI.            Active Problems  Problems    · Abdominal pain, lower (789.09) (R10.30)   · Acquired claw toe of  left foot (735.5) (M20.5X2)   · Back pain with radiation (724.5) (M54.9)   · Bicuspid aortic valve (746.4) (Q23.1)   · Bright red blood per rectum (569.3) (K62.5)   · Cardiac murmur (785.2) (R01.1)   · Cyst of left kidney (753.10) (N28.1)   · Diarrhea of presumed infectious origin (009.3) (R19.7)   · Enteropathic arthritis (713.1) (M07.60)   · Enthesopathy (726.90) (M77.9)   · Facet arthropathy, lumbar (721.3) (M47.816)   · Finger injury (959.5) (S69.90XA)   · Foot pain, bilateral (729.5) (M79.671,M79.672)   · Hallux rigidus of both feet (735.2) (M20.21,M20.22)   · Hyperthyroidism (242.90) (E05.90)   · Immunocompromised patient (279.9) (D84.9)   · Left foot pain (729.5) (M79.672)   · Left shoulder pain (719.41) (M25.512)   · Low back pain (724.2) (M54.50)   · Myofascial pain (729.1) (M79.18)   · Nephrolithiasis (592.0) (N20.0)   · Sacroiliitis (720.2) (M46.1)   · Segmental and somatic dysfunction of upper extremity (739.7) (M99.07)   · Subclinical hyperthyroidism (242.90) (E05.90)   · Swelling of finger, left (729.81) (M79.89)   · Synovitis (727.00) (M65.9)   · 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,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)   · Vitamin D deficiency (268.9) (E55.9)     Surgical History  Problems    · History of Cystoscopy With Insertion Of Ureteral Stent Right   · History of Lithotripsy     Family History  Mother    · Family history of kidney stones (V18.69) (Z84.1)   · Family history of rheumatoid arthritis (V17.7) (Z82.61)  Father    · Family history of kidney stones (V18.69) (Z84.1)  Sister    · Family history of ulcerative colitis (V18.59) (Z83.79)  Multiple Family Members    · Family history of kidney stones (V18.69) (Z84.1)     Social History  Problems    · Full-time employment   · Never smoker   · No illicit drug use   · Recently quit alcohol use   · Single     Allergies  Medication    · No Known Drug Allergies   Recorded By: Adali Kennedy; 10/23/2014 1:07:13 PM  NonMedication    · No Known Food  "Allergies   Recorded By: Diana Nava; 7/26/2022 11:25:20 AM     Current Meds     Current Outpatient Medications   Medication Sig Dispense Refill    etrasimod 2 mg tablet Take 2 mg by mouth once daily.      hydrocortisone (Anusol-HC) 25 mg suppository Insert 1 suppository (25 mg) into the rectum once daily. Take prior to procedure as directed 30 suppository 3    hydrocortisone (Cortenema) 100 mg/60 mL enema Insert 1 enema (100 mg) into the rectum once daily at bedtime. Use as directed (Patient not taking: Reported on 8/27/2024) 1680 mL 1    methylcellulose, laxative, (CitruceL) 500 mg tablet Take 2 tablets (1,000 mg) by mouth once daily at bedtime.      potassium citrate CR (Urocit-K-15) 15 mEq ER tablet Take 1 tablet (15 mEq) by mouth once daily.      predniSONE (Deltasone) 10 mg tablet Take 4 tablets by mouth once daily in the morning for 14 days then lower by 1/2 tablet every week until off of prednisone 100 tablet 12     No current facility-administered medications for this visit.                    Physical Exam  Blood pressure 136/77, pulse 82, height 1.727 m (5' 8\"), weight 71.7 kg (158 lb).  General - Alert and oriented  Head: Normocephalic, atraumatic  Eyes - PERRLA, EOMI. No conjunctiva injection.   Mouth/ENT - Moist oral and nasal mucosa.   Cardiovascular - Normal S1, S2. Regular rate and rhythm. No murmurs or rubs.  Lungs - Symmetric chest expansion. Clear to auscultation bilaterally.   Skin - No rashes or ulcers. Skin warm and dry. No erythema on bilateral cheeks.  Abdomen - Soft, non-tender. No masses. Normal bowel sounds.  Extremities - No edema, cyanosis ,or clubbing  Neurological - Alert and oriented x 3, grossly intact. No focal deficit.  Musculoskeletal -  EXAMJOINTDETAILED,  Shoulders: Full ROM, without pain, no swelling, warmth or tenderness.  Elbows: Full ROM, without pain, no swelling, warmth or tenderness.  Wrists: Full ROM, without pain, no swelling, warmth or tenderness.  MCP: No " swelling, warmth or tenderness. Metacarpal squeeze negative  PIP: No swelling, warmth or tenderness.  DIP: No swelling, warmth or tenderness.  Hands : 5/5.   Sacroiliac joints: No local tenderness. HANG negative.   Hips: Full ROM. No malalignment.  Knees: Full ROM, without pain, no swelling, warmth or point tenderness. No joint line tenderness, no pes anserine tenderness.  Back: Full range of motion      Component      Latest Ref Rng 9/17/2024   WBC      4.4 - 11.3 x10*3/uL 4.0 (L)    nRBC      0.0 - 0.0 /100 WBCs 0.0    RBC      4.50 - 5.90 x10*6/uL 4.74    HEMOGLOBIN      13.5 - 17.5 g/dL 13.3 (L)    HEMATOCRIT      41.0 - 52.0 % 40.8 (L)    MCV      80 - 100 fL 86    MCH      26.0 - 34.0 pg 28.1    MCHC      32.0 - 36.0 g/dL 32.6    RED CELL DISTRIBUTION WIDTH      11.5 - 14.5 % 13.1    Platelets      150 - 450 x10*3/uL 217    Neutrophils %      40.0 - 80.0 % 72.4    Immature Granulocytes %, Automated      0.0 - 0.9 % 0.5    Lymphocytes %      13.0 - 44.0 % 10.1    Monocytes %      2.0 - 10.0 % 15.4    Eosinophils %      0.0 - 6.0 % 1.3    Basophils %      0.0 - 2.0 % 0.3    Neutrophils Absolute      1.20 - 7.70 x10*3/uL 2.86    Immature Granulocytes Absolute, Automated      0.00 - 0.70 x10*3/uL 0.02    Lymphocytes Absolute      1.20 - 4.80 x10*3/uL 0.40 (L)    Monocytes Absolute      0.10 - 1.00 x10*3/uL 0.61    Eosinophils Absolute      0.00 - 0.70 x10*3/uL 0.05    Basophils Absolute      0.00 - 0.10 x10*3/uL 0.01    Albumin      3.4 - 5.0 g/dL 4.3    Bilirubin Total      0.0 - 1.2 mg/dL 0.6    Bilirubin, Direct      0.0 - 0.3 mg/dL 0.1    Alkaline Phosphatase      33 - 120 U/L 66    ALT      10 - 52 U/L 21    AST      9 - 39 U/L 13    Total Protein      6.4 - 8.2 g/dL 7.2    Calprotectin, Stool      <=49 ug/g 570 (H)    C-Reactive Protein      <1.00 mg/dL 0.13           Assessment/plan: 38 yr old WM with UC and enteropathic arthritis: From joints standpoint, he is in remission.   Discussed about getting a  pneumococcal vaccine.      Reviewed and approved by ADOLPH AGUILAR on 9/23/24 at 11:46 AM.

## 2024-09-24 ENCOUNTER — TELEPHONE (OUTPATIENT)
Dept: GASTROENTEROLOGY | Facility: HOSPITAL | Age: 39
End: 2024-09-24

## 2024-09-24 ENCOUNTER — OFFICE VISIT (OUTPATIENT)
Dept: GASTROENTEROLOGY | Facility: HOSPITAL | Age: 39
End: 2024-09-24
Payer: COMMERCIAL

## 2024-09-24 VITALS
TEMPERATURE: 97.9 F | BODY MASS INDEX: 23.95 KG/M2 | HEART RATE: 78 BPM | SYSTOLIC BLOOD PRESSURE: 130 MMHG | HEIGHT: 68 IN | WEIGHT: 158 LBS | DIASTOLIC BLOOD PRESSURE: 79 MMHG | OXYGEN SATURATION: 98 %

## 2024-09-24 DIAGNOSIS — K51.211 ULCERATIVE PROCTITIS WITH RECTAL BLEEDING (MULTI): Primary | ICD-10-CM

## 2024-09-24 PROCEDURE — 1036F TOBACCO NON-USER: CPT | Performed by: INTERNAL MEDICINE

## 2024-09-24 PROCEDURE — 3008F BODY MASS INDEX DOCD: CPT | Performed by: INTERNAL MEDICINE

## 2024-09-24 PROCEDURE — RXMED WILLOW AMBULATORY MEDICATION CHARGE

## 2024-09-24 PROCEDURE — 99213 OFFICE O/P EST LOW 20 MIN: CPT | Performed by: INTERNAL MEDICINE

## 2024-09-24 ASSESSMENT — PAIN SCALES - GENERAL: PAINLEVEL: 0-NO PAIN

## 2024-09-24 NOTE — PATIENT INSTRUCTIONS
As discussed today,     PLAN  1) continue to taper prednisone; day at 10 mg prednisone daily for 1 additional week, then lowered to 5 mg daily as tolerated for 2 weeks, then stop prednisone  2) continue Etrasimod 2 mg daily  3) follow-up in the office in 4 weeks as scheduled  4) plan for flexible sigmoidoscopy 11/15/24 as scheduled  5) contact Dr. Looney if symptoms worsening prior to follow-up

## 2024-09-25 ENCOUNTER — PHARMACY VISIT (OUTPATIENT)
Dept: PHARMACY | Facility: CLINIC | Age: 39
End: 2024-09-25
Payer: COMMERCIAL

## 2024-10-27 PROCEDURE — RXMED WILLOW AMBULATORY MEDICATION CHARGE

## 2024-10-29 ENCOUNTER — OFFICE VISIT (OUTPATIENT)
Dept: GASTROENTEROLOGY | Facility: HOSPITAL | Age: 39
End: 2024-10-29
Payer: COMMERCIAL

## 2024-10-29 ENCOUNTER — PHARMACY VISIT (OUTPATIENT)
Dept: PHARMACY | Facility: CLINIC | Age: 39
End: 2024-10-29
Payer: COMMERCIAL

## 2024-10-29 VITALS
SYSTOLIC BLOOD PRESSURE: 119 MMHG | DIASTOLIC BLOOD PRESSURE: 79 MMHG | BODY MASS INDEX: 24.39 KG/M2 | TEMPERATURE: 97 F | OXYGEN SATURATION: 98 % | WEIGHT: 160.4 LBS | HEART RATE: 82 BPM

## 2024-10-29 DIAGNOSIS — K51.211 ULCERATIVE PROCTITIS WITH RECTAL BLEEDING (MULTI): Primary | ICD-10-CM

## 2024-10-29 PROCEDURE — 99213 OFFICE O/P EST LOW 20 MIN: CPT | Performed by: INTERNAL MEDICINE

## 2024-10-29 PROCEDURE — 1036F TOBACCO NON-USER: CPT | Performed by: INTERNAL MEDICINE

## 2024-10-29 SDOH — ECONOMIC STABILITY: FOOD INSECURITY: WITHIN THE PAST 12 MONTHS, THE FOOD YOU BOUGHT JUST DIDN'T LAST AND YOU DIDN'T HAVE MONEY TO GET MORE.: NEVER TRUE

## 2024-10-29 SDOH — ECONOMIC STABILITY: FOOD INSECURITY: WITHIN THE PAST 12 MONTHS, YOU WORRIED THAT YOUR FOOD WOULD RUN OUT BEFORE YOU GOT MONEY TO BUY MORE.: NEVER TRUE

## 2024-10-29 ASSESSMENT — PATIENT HEALTH QUESTIONNAIRE - PHQ9
1. LITTLE INTEREST OR PLEASURE IN DOING THINGS: NOT AT ALL
2. FEELING DOWN, DEPRESSED OR HOPELESS: NOT AT ALL
SUM OF ALL RESPONSES TO PHQ9 QUESTIONS 1 & 2: 0

## 2024-10-29 ASSESSMENT — LIFESTYLE VARIABLES
AUDIT-C TOTAL SCORE: 0
SKIP TO QUESTIONS 9-10: 1
HOW OFTEN DO YOU HAVE A DRINK CONTAINING ALCOHOL: NEVER
HOW MANY STANDARD DRINKS CONTAINING ALCOHOL DO YOU HAVE ON A TYPICAL DAY: PATIENT DOES NOT DRINK
HOW OFTEN DO YOU HAVE SIX OR MORE DRINKS ON ONE OCCASION: NEVER

## 2024-10-29 ASSESSMENT — PAIN SCALES - GENERAL: PAINLEVEL_OUTOF10: 4

## 2024-11-12 ENCOUNTER — LAB (OUTPATIENT)
Dept: LAB | Facility: LAB | Age: 39
End: 2024-11-12
Payer: COMMERCIAL

## 2024-11-12 ENCOUNTER — APPOINTMENT (OUTPATIENT)
Dept: PRIMARY CARE | Facility: CLINIC | Age: 39
End: 2024-11-12
Payer: COMMERCIAL

## 2024-11-12 VITALS
SYSTOLIC BLOOD PRESSURE: 118 MMHG | HEIGHT: 68 IN | WEIGHT: 161.4 LBS | OXYGEN SATURATION: 97 % | BODY MASS INDEX: 24.46 KG/M2 | DIASTOLIC BLOOD PRESSURE: 68 MMHG | HEART RATE: 75 BPM

## 2024-11-12 DIAGNOSIS — Q23.81 BICUSPID AORTIC VALVE: ICD-10-CM

## 2024-11-12 DIAGNOSIS — N28.1 CYST OF LEFT KIDNEY: ICD-10-CM

## 2024-11-12 DIAGNOSIS — Z00.00 HEALTH MAINTENANCE EXAMINATION: Primary | ICD-10-CM

## 2024-11-12 DIAGNOSIS — E05.90 SUBCLINICAL HYPERTHYROIDISM: ICD-10-CM

## 2024-11-12 DIAGNOSIS — K51.211 ULCERATIVE PROCTITIS WITH RECTAL BLEEDING (MULTI): ICD-10-CM

## 2024-11-12 DIAGNOSIS — K51.319 ULCERATIVE COLITIS, RECTOSIGMOID, UNSPECIFIED COMPLICATION (MULTI): ICD-10-CM

## 2024-11-12 DIAGNOSIS — R73.01 IMPAIRED FASTING GLUCOSE: ICD-10-CM

## 2024-11-12 DIAGNOSIS — N20.0 NEPHROLITHIASIS: ICD-10-CM

## 2024-11-12 PROCEDURE — 99396 PREV VISIT EST AGE 40-64: CPT | Performed by: STUDENT IN AN ORGANIZED HEALTH CARE EDUCATION/TRAINING PROGRAM

## 2024-11-12 PROCEDURE — 3008F BODY MASS INDEX DOCD: CPT | Performed by: STUDENT IN AN ORGANIZED HEALTH CARE EDUCATION/TRAINING PROGRAM

## 2024-11-12 PROCEDURE — 83993 ASSAY FOR CALPROTECTIN FECAL: CPT

## 2024-11-12 ASSESSMENT — PATIENT HEALTH QUESTIONNAIRE - PHQ9
1. LITTLE INTEREST OR PLEASURE IN DOING THINGS: NOT AT ALL
2. FEELING DOWN, DEPRESSED OR HOPELESS: NOT AT ALL
SUM OF ALL RESPONSES TO PHQ9 QUESTIONS 1 AND 2: 0

## 2024-11-12 ASSESSMENT — PAIN SCALES - GENERAL: PAINLEVEL_OUTOF10: 4

## 2024-11-12 NOTE — PROGRESS NOTES
Guilherme Ocasio is a 39 y.o. male [place] [facility] [place] Dom[place]ng[place] on [place]/12/24 for routine care, as well as for management of the following chronic medical conditions: Ulcerative Colitis (follows with GI, Aayush), Nephrolithiasis (seen by urology), Bicuspid Aortic Valve, peripheral spondylarthritis (follows with Rheumatology), Subclinical Hyperthyroidism (follows with endo). Patient presents today to establish care and for CPE. He is former patient of Dr. Altamirano.     #UC  - diagnosed 2/2021   - difficult to control with lack of clinical or endoscopic remission at present  - prior regimens: Humira, Remicade, Entyvio, Tofacitinib, and Upadacitinib (started 01/2024, interrupted by PNA in spring 2024)   - recently on systemic steroid taper for ~12 weeks, completed around 1 month ago  - at presents, continues to have 6-8 bowel movements per day (improved to ~3 per day when on systemic steroids)   - HC suppositories  - complications include prior Norovirus infection  [  ] pending Flex sig 11/15/2024   [  ] currently on Velsipity (Etrasimod), started 8/10/2024   - recent fecal calprotectin again >3000 (from 570 when on steroids)  - last CBC with leukopenia (WBC 4.0, lymphopenia 400), mild anemia (Hgb 13.3, improved from prior; normocytic), improved CRP, normal LFTs, reassuring renal function  [  ] may consider Adalimumab or an IL 23 inhibitor for associated joint pain (notable ankle)   [  ] pending repeat labs per GI     #Enteropathic Arhtitis   - follows with Rheumatology, Dr. Cordova   - workup with negative HLA-B27 testing   - last seen 09/2024 without any joint concerns at that visit   - prior methotrexate   [  ] PCV vaccine advised     #Bicuspid Aortic Valve  - cardiac auscultation with murmur   - last echo 06/2018 with bicuspid aortic valve; no other valvular or aortic dysfunction/abnormalities  - preserved EF  [  ] with normal thoracic aortic diameters (normal per echo 2018 and CT chest 2020), the  thoracic aorta should be reassessed every three to five years: overdue, ordered today     #Nephrolithiasis   #Renal Cyst, L   - first episodes 09/2015 and 05/2016 (first passed spontaneously; second was 6mm R upper ureteral stone without hydronephrosis requiring cystoscopy, R retrograde pyelogram, R ureteroscopy, laser lithotripsy/stone basketing and stent placement 6/21/16 with Dr. Batista)   - Calcium Oxalate per stone analysis (strong family history as well)   - 24 hour urine collection with low volume, borderline hyeroxaluria, marked hypocitraturia, low pH and uric acid supersatruation   - hydration and potassium citrate supplementation initial treatment   - OFF potassium citrate at this time in setting of UC and GI side effects of medication   - hydration predominant preventative modality of treatment at this time   - last seen 02/2023 with plans for annual follow up, overdue   [  ] kidney US updated (last 04/2023 with R-sided 3mm focus and 0.7x0.8x0.8 upper pole cyst, also seen in 06/2018 scan; L-sided 2.7x1.6.1.7 septated lower pole cystic lesion, minimally increased in size but similar to prior exams; also L-sided 0.7x0.6x0.7cm exophytic hypoechoic upper pole lesion)    #Subclinical Hyperthyroidism   - follows with Matthew grubbs  - prior reassuring imaging/uptake scan (except for nodules)   - last ultrasound 2021; discussed 3-5 years repeat scan  [  ] due for updated US, ordered by Endo; he will pursue in 2025   [  ] TFTs surveillance per endo (last 03/2024 with TSH 0.3 from 0.19, normal T3 and T4)       #Hepatic Hemangioma  - seen incidentally on imaging (0.8x0.9x0.8cm per renal US 2023)     Past Medical History: as above   Past Medical History:   Diagnosis Date    Arthritis     Bicuspid aortic valve     Disease of thyroid gland     Kidney stones     Ulcerative colitis      Subspecialty Medical Care: GI, Rheumatology, Urology, Endocrinology, Ophthalmology     Past Surgical History: Lithotripsy   Past  "Surgical History:   Procedure Laterality Date    OTHER SURGICAL HISTORY  07/27/2016    Cystoscopy With Insertion Of Ureteral Stent Right    OTHER SURGICAL HISTORY  07/27/2016    Lithotripsy     Medications:  Current Outpatient Medications:     etrasimod 2 mg tablet, Take 2 mg by mouth once daily., Disp: , Rfl:     hydrocortisone (Anusol-HC) 25 mg suppository, Insert 1 suppository (25 mg) into the rectum once daily. Take prior to procedure as directed, Disp: 30 suppository, Rfl: 3  Pharmacy: [facility]      Allergies: NKDA   No Known Allergies    Immunizations:   - Flu advised  - Updated COVID booster recommended   - Tdap advised  - PCV-20 advised   - Shingrix UTD     Family History:   - UC (sister)   Family History   Problem Relation Name Age of Onset    Kidney nephrosis Mother      Rheum arthritis Mother      Kidney nephrosis Father      Ulcerative colitis Sister      Kidney nephrosis Other       Social History:   Home/Living Situation/Falls/Safety Assessment: lives at home with wife and son; 2 in January   Education/Employment/Work/Vocational: works at , [employer],  ([employer] resident)   Activities: treadmill daily, 3-5 daily   Drug Use: no smoking or alcohol use  Diet: healthy dietary habits   Depression/Anxiety: stable  Sexuality/Contraception/Menstrual History:    Sleep: poor sleep habits     Patient Information:  Health Insurance: has insurance,    Healthcare POA/Guardian/Emergency Contact: wife   Contact Information: correct in EMR     Visit Vitals  /76 (BP Location: Right arm, Patient Position: Sitting, BP Cuff Size: Adult)   Pulse 75   Ht 1.727 m (5' 8\")   Wt 73.2 kg (161 lb 6.4 oz)   SpO2 97%   BMI 24.54 kg/m²   Smoking Status Never   BSA 1.87 m²      PHYSICAL EXAM:   General: well appearing male, NAD, pleasant and engaged in encounter    HEENT: NCAT, MMM  CV: RRR, +1-2/6 murmur (known bicuspid AV)   PULM: CTAB, non-labored respirations   ABD: soft, NT, ND, + bowel sounds   EXT: WWP, no " significant edema   NEURO: A&Ox4, symmetric facies, no gross motor or sensory deficits, normal gait  PSYCH: pleasant mood, appropriate affect     Assessment/Plan    Guilherme Ocasio is a 39 y.o. male seen in [place] [facility] [place]. John Domínguez on 11/12/24 for routine care, as well as for management of the following chronic medical conditions: Ulcerative Colitis (follows with GI, Aayush), Nephrolithiasis (seen by urology), Bicuspid Aortic Valve, peripheral spondylarthritis (follows with Rheumatology), Subclinical Hyperthyroidism (follows with endo). Patient presents today to establish care and for CPE. He is former patient of Dr. Altamirano.     #UC  - diagnosed 2/2021   - difficult to control with lack of clinical or endoscopic remission at present  - prior regimens: Humira, Remicade, Entyvio, Tofacitinib, and Upadacitinib (started 01/2024, interrupted by PNA in spring 2024)   - recently on systemic steroid taper for ~12 weeks, completed around 1 month ago  - at presents, continues to have 6-8 bowel movements per day (improved to ~3 per day when on systemic steroids)   - HC suppositories  - complications include prior Norovirus infection  [  ] pending Flex sig 11/15/2024   [  ] currently on Velsipity (Etrasimod), started 8/10/2024   - recent fecal calprotectin again >3000 (from 570 when on steroids)  - last CBC with leukopenia (WBC 4.0, lymphopenia 400), mild anemia (Hgb 13.3, improved from prior; normocytic), improved CRP, normal LFTs, reassuring renal function  [  ] may consider Adalimumab or an IL 23 inhibitor for associated joint pain (notable ankle)   [  ] pending repeat labs per GI     #Enteropathic Arhtitis   - follows with Rheumatology, Dr. Cordova   - workup with negative HLA-B27 testing   - last seen 09/2024 without any joint concerns at that visit   - prior methotrexate   [  ] PCV vaccine advised     #Bicuspid Aortic Valve  - cardiac auscultation with murmur   - last echo 06/2018 with bicuspid aortic valve; no  other valvular or aortic dysfunction/abnormalities  - preserved EF  [  ] with normal thoracic aortic diameters (normal per echo 2018 and CT chest 2020), the thoracic aorta should be reassessed every three to five years: overdue, ordered today     #Nephrolithiasis   #Renal Cyst, L   - first episodes 09/2015 and 05/2016 (first passed spontaneously; second was 6mm R upper ureteral stone without hydronephrosis requiring cystoscopy, R retrograde pyelogram, R ureteroscopy, laser lithotripsy/stone basketing and stent placement 6/21/16 with Dr. Batista)   - Calcium Oxalate per stone analysis (strong family history as well)   - 24 hour urine collection with low volume, borderline hyeroxaluria, marked hypocitraturia, low pH and uric acid supersatruation   - hydration and potassium citrate supplementation initial treatment   - OFF potassium citrate at this time in setting of UC and GI side effects of medication   - hydration predominant preventative modality of treatment at this time   - last seen 02/2023 with plans for annual follow up, overdue   [  ] kidney US updated (last 04/2023 with R-sided 3mm focus and 0.7x0.8x0.8 upper pole cyst, also seen in 06/2018 scan; L-sided 2.7x1.6.1.7 septated lower pole cystic lesion, minimally increased in size but similar to prior exams; also L-sided 0.7x0.6x0.7cm exophytic hypoechoic upper pole lesion)    #Subclinical Hyperthyroidism   - follows with endo, Hr. Hadam  - prior reassuring imaging/uptake scan (except for nodules)   - last ultrasound 2021; discussed 3-5 years repeat scan  [  ] due for updated US, ordered by Endo; he will pursue in 2025   [  ] TFTs surveillance per endo (last 03/2024 with TSH 0.3 from 0.19, normal T3 and T4)       #Hepatic Hemangioma  - seen incidentally on imaging (0.8x0.9x0.8cm per renal US 2023)     #Health Maintenance    Cancer Screening  - Colorectal Cancer Screening: follows   - Lung Cancer Screening: non-smoker   - Prostate Cancer Screening: no family  history, due at 55     Laboratory Screening  - Lipid Screen: LDL 92 per labs 03/2024   - ASCVD Score: 0.7%; consider CAC scoring at 45 for further risk stratification   - A1C, glucose screen: last glucose 120 (unclear if fasting); repeat glucose and A1C with next labs   - STI, HIV, Hep B screen: prior negative; HIV negative per telephone note 8/16/2022, though unable to verify on lab review   - Hep C screen: prior negative     Imaging Screening  - AAA screening: due at 65  - Osteoporosis/DEXA screening: follows with Rheumatology; consider pending ongoing steroid burden related to autoimmune conditions as above     Immunizations:   - Flu advised  - Updated COVID booster recommended   - Tdap advised  - PCV-20 advised   - Shingrix UTD     Other Screening  - Health Literacy Assessment: excellent, MD   - Depression screen: +stressors related to chronic illness but coping well, denies depression today   - Home safety/partner violence screen: negative   - Hearing/Vision screens: seen by optho  - Alcohol/tobacco/drug use screen: no alcohol or tobacco use   - Healthcare POA/Advanced Directives: wife     Referrals: renal US, echo, labs, immunization discussion     Patient Discussion:    Please call back the office with any questions at [phone]. In the case of an emergency, please call 911 or go to the nearest Emergency Department.      John Domínguez MD  Internal Medicine-Pediatrics  [facility] [place] [address], [address]  P: [phone], F: [phone]

## 2024-11-14 LAB — CALPROTECTIN STL-MCNT: >3000 UG/G

## 2024-11-15 ENCOUNTER — ANESTHESIA (OUTPATIENT)
Dept: GASTROENTEROLOGY | Facility: HOSPITAL | Age: 39
End: 2024-11-15
Payer: COMMERCIAL

## 2024-11-15 ENCOUNTER — HOSPITAL ENCOUNTER (OUTPATIENT)
Dept: GASTROENTEROLOGY | Facility: HOSPITAL | Age: 39
Discharge: HOME | End: 2024-11-15
Payer: COMMERCIAL

## 2024-11-15 ENCOUNTER — ANESTHESIA EVENT (OUTPATIENT)
Dept: GASTROENTEROLOGY | Facility: HOSPITAL | Age: 39
End: 2024-11-15
Payer: COMMERCIAL

## 2024-11-15 VITALS
RESPIRATION RATE: 16 BRPM | SYSTOLIC BLOOD PRESSURE: 122 MMHG | DIASTOLIC BLOOD PRESSURE: 70 MMHG | HEIGHT: 68 IN | BODY MASS INDEX: 24.46 KG/M2 | OXYGEN SATURATION: 96 % | HEART RATE: 63 BPM | WEIGHT: 161.4 LBS | TEMPERATURE: 98.1 F

## 2024-11-15 DIAGNOSIS — K51.211 ULCERATIVE PROCTITIS WITH RECTAL BLEEDING (MULTI): Primary | ICD-10-CM

## 2024-11-15 DIAGNOSIS — K51.311 ULCERATIVE RECTOSIGMOIDITIS WITH RECTAL BLEEDING (MULTI): Primary | ICD-10-CM

## 2024-11-15 PROCEDURE — 45331 SIGMOIDOSCOPY AND BIOPSY: CPT | Performed by: INTERNAL MEDICINE

## 2024-11-15 PROCEDURE — 7100000009 HC PHASE TWO TIME - INITIAL BASE CHARGE

## 2024-11-15 PROCEDURE — 99153 MOD SED SAME PHYS/QHP EA: CPT | Performed by: INTERNAL MEDICINE

## 2024-11-15 PROCEDURE — 3700000013 HC SEDATION LEVEL 5+ TIME - EACH ADDITIONAL 15 MINUTES

## 2024-11-15 PROCEDURE — 99152 MOD SED SAME PHYS/QHP 5/>YRS: CPT | Performed by: INTERNAL MEDICINE

## 2024-11-15 PROCEDURE — 7100000010 HC PHASE TWO TIME - EACH INCREMENTAL 1 MINUTE

## 2024-11-15 PROCEDURE — 3700000012 HC SEDATION LEVEL 5+ TIME - INITIAL 15 MINUTES 5/> YEARS

## 2024-11-15 PROCEDURE — 2500000004 HC RX 250 GENERAL PHARMACY W/ HCPCS (ALT 636 FOR OP/ED): Performed by: INTERNAL MEDICINE

## 2024-11-15 RX ORDER — ACETAMINOPHEN 325 MG/1
650 TABLET ORAL EVERY 4 HOURS PRN
OUTPATIENT
Start: 2024-11-15

## 2024-11-15 RX ORDER — SODIUM CHLORIDE, SODIUM LACTATE, POTASSIUM CHLORIDE, CALCIUM CHLORIDE 600; 310; 30; 20 MG/100ML; MG/100ML; MG/100ML; MG/100ML
100 INJECTION, SOLUTION INTRAVENOUS CONTINUOUS
OUTPATIENT
Start: 2024-11-15 | End: 2024-11-15

## 2024-11-15 RX ORDER — MEPERIDINE HYDROCHLORIDE 50 MG/ML
INJECTION INTRAMUSCULAR; INTRAVENOUS; SUBCUTANEOUS AS NEEDED
Status: COMPLETED | OUTPATIENT
Start: 2024-11-15 | End: 2024-11-15

## 2024-11-15 RX ORDER — FENTANYL CITRATE 50 UG/ML
INJECTION, SOLUTION INTRAMUSCULAR; INTRAVENOUS AS NEEDED
Status: COMPLETED | OUTPATIENT
Start: 2024-11-15 | End: 2024-11-15

## 2024-11-15 RX ORDER — PREDNISONE 10 MG/1
TABLET ORAL
Qty: 112 TABLET | Refills: 1 | Status: SHIPPED | OUTPATIENT
Start: 2024-11-15 | End: 2024-12-15

## 2024-11-15 RX ORDER — OXYCODONE HYDROCHLORIDE 5 MG/1
5 TABLET ORAL EVERY 4 HOURS PRN
OUTPATIENT
Start: 2024-11-15

## 2024-11-15 RX ORDER — ONDANSETRON HYDROCHLORIDE 2 MG/ML
4 INJECTION, SOLUTION INTRAVENOUS ONCE AS NEEDED
OUTPATIENT
Start: 2024-11-15

## 2024-11-15 RX ORDER — MIDAZOLAM HYDROCHLORIDE 1 MG/ML
INJECTION, SOLUTION INTRAMUSCULAR; INTRAVENOUS AS NEEDED
Status: COMPLETED | OUTPATIENT
Start: 2024-11-15 | End: 2024-11-15

## 2024-11-15 RX ORDER — FENTANYL CITRATE 50 UG/ML
50 INJECTION, SOLUTION INTRAMUSCULAR; INTRAVENOUS EVERY 5 MIN PRN
OUTPATIENT
Start: 2024-11-15

## 2024-11-15 RX ORDER — LIDOCAINE HYDROCHLORIDE 10 MG/ML
0.1 INJECTION, SOLUTION EPIDURAL; INFILTRATION; INTRACAUDAL; PERINEURAL ONCE
OUTPATIENT
Start: 2024-11-15 | End: 2024-11-15

## 2024-11-15 ASSESSMENT — PAIN SCALES - GENERAL
PAINLEVEL_OUTOF10: 0 - NO PAIN
PAINLEVEL_OUTOF10: 4
PAINLEVEL_OUTOF10: 0 - NO PAIN

## 2024-11-15 ASSESSMENT — PAIN - FUNCTIONAL ASSESSMENT
PAIN_FUNCTIONAL_ASSESSMENT: 0-10

## 2024-11-15 ASSESSMENT — COLUMBIA-SUICIDE SEVERITY RATING SCALE - C-SSRS
6. HAVE YOU EVER DONE ANYTHING, STARTED TO DO ANYTHING, OR PREPARED TO DO ANYTHING TO END YOUR LIFE?: NO
1. IN THE PAST MONTH, HAVE YOU WISHED YOU WERE DEAD OR WISHED YOU COULD GO TO SLEEP AND NOT WAKE UP?: NO
2. HAVE YOU ACTUALLY HAD ANY THOUGHTS OF KILLING YOURSELF?: NO

## 2024-11-15 ASSESSMENT — PAIN DESCRIPTION - DESCRIPTORS: DESCRIPTORS: DISCOMFORT

## 2024-11-15 NOTE — ANESTHESIA PREPROCEDURE EVALUATION
Patient: Guilherme Ocasio    Procedure Information       Date/Time: 11/15/24 1400    Scheduled providers: Kevin Looney MD; Sweetie Foley MD    Procedure: FLEXIBLE SIGMOIDOSCOPY    Location: [facility]            Relevant Problems   Anesthesia (within normal limits)      Cardiac   (+) Bicuspid aortic valve determined by imaging   (+) Cardiac murmur      Pulmonary (within normal limits)      GI   (+) Bright red blood per rectum   (+) Ulcerative proctitis with rectal bleeding (Multi)      Endocrine   (+) Subclinical hyperthyroidism      ID   (+) Immunocompromised patient       Clinical information reviewed:    Allergies                NPO Detail:  NPO/Void Status  Carbohydrate Drink Given Prior to Surgery? : N  Date of Last Liquid: 11/15/24  Time of Last Liquid: 0900  Date of Last Solid: 11/14/24  Time of Last Solid: 2000  Last Intake Type: Clear fluids  Time of Last Void: 1300         Physical Exam    Airway  Mallampati: II  TM distance: >3 FB  Neck ROM: full     Cardiovascular    Dental - normal exam     Pulmonary    Abdominal            Anesthesia Plan    History of general anesthesia?: yes  History of complications of general anesthesia?: no    ASA 2     MAC     intravenous induction   Anesthetic plan and risks discussed with patient.    Dr. Looney and patient decided to proceed with conscious sedation instead of MAC.

## 2024-11-15 NOTE — H&P
"History Of Present Illness  Guilherme Ocasio is a 39 y.o. male presenting with ulcerative colitis.  Currently on etrasimod.     Past Medical History  Past Medical History:   Diagnosis Date    Arthritis     Bicuspid aortic valve     Disease of thyroid gland     Kidney stones     Ulcerative colitis      Surgical History  Past Surgical History:   Procedure Laterality Date    COLONOSCOPY      OTHER SURGICAL HISTORY  07/27/2016    Cystoscopy With Insertion Of Ureteral Stent Right    OTHER SURGICAL HISTORY  07/27/2016    Lithotripsy     Social History  He reports that he has never smoked. He has never been exposed to tobacco smoke. He has never used smokeless tobacco. He reports that he does not currently use alcohol. He reports that he does not use drugs.    Family History  Family History   Problem Relation Name Age of Onset    Kidney nephrosis Mother      Rheum arthritis Mother      Kidney nephrosis Father      Ulcerative colitis Sister      Kidney nephrosis Other          Allergies  No Known Allergies  Review of Systems     Physical Exam   /77   Pulse 73   Temp 36.7 °C (98.1 °F) (Temporal)   Resp 18   Ht 1.727 m (5' 8\")   Wt 73.2 kg (161 lb 6.4 oz)   SpO2 98%   BMI 24.54 kg/m²   Vital signs reviewed  Patient alert and oriented in no acute distress  Anicteric  Cardiac exam regular rate and rhythm S1-S2 without murmurs gallops or rubs  Lungs clear to auscultation bilaterally  Abdomen soft and nontender     Last Recorded Vitals  Blood pressure 140/77, pulse 73, temperature 36.7 °C (98.1 °F), temperature source Temporal, resp. rate 18, height 1.727 m (5' 8\"), weight 73.2 kg (161 lb 6.4 oz), SpO2 98%.    Assessment/Plan   UC for flex sig to evaluate disease activity     Kevin Looney MD  "

## 2024-11-16 ENCOUNTER — DOCUMENTATION (OUTPATIENT)
Dept: GASTROENTEROLOGY | Facility: HOSPITAL | Age: 39
End: 2024-11-16
Payer: COMMERCIAL

## 2024-11-16 DIAGNOSIS — K51.211 ULCERATIVE PROCTITIS WITH RECTAL BLEEDING (MULTI): Primary | ICD-10-CM

## 2024-11-16 NOTE — PROGRESS NOTES
Feeling worse.  Stools up to 8-10 daily.  Now with some joint discomfort and also some oral aphthous ulcers on tongue and gums.  No nocturnal stools.  (+) urgency (+) blood about 2-3 times per week.  Tolerating etrasimod without difficulty.    F/S show persistent proctosigmoiditis with Gale 3 disease in the distal 3-5 cm of the rectum with ulceration and otherwise Gale 2 disease in rectum and sigmoid.  Descending colon remains normal endoscopically.  Biopsies taken.    Will give him prednisone again.  We gave him an 8 week taper in July and August and helped some.  Pred40 mg x 14 days then taper 5 mg per week.  Will continue etrasimod (gets via patient program for free) and add rizankizumab.    RT 12/17/2024.  Plans reviewed with patient today over the telephone.

## 2024-11-18 DIAGNOSIS — K51.211 ULCERATIVE PROCTITIS WITH RECTAL BLEEDING (MULTI): Primary | ICD-10-CM

## 2024-11-18 DIAGNOSIS — K51.319 ULCERATIVE COLITIS, RECTOSIGMOID, UNSPECIFIED COMPLICATION (MULTI): Primary | ICD-10-CM

## 2024-11-18 RX ORDER — FAMOTIDINE 10 MG/ML
20 INJECTION INTRAVENOUS ONCE AS NEEDED
OUTPATIENT
Start: 2024-11-18

## 2024-11-18 RX ORDER — ALBUTEROL SULFATE 0.83 MG/ML
3 SOLUTION RESPIRATORY (INHALATION) AS NEEDED
OUTPATIENT
Start: 2024-11-18

## 2024-11-18 RX ORDER — EPINEPHRINE 0.3 MG/.3ML
0.3 INJECTION SUBCUTANEOUS EVERY 5 MIN PRN
OUTPATIENT
Start: 2024-11-18

## 2024-11-18 RX ORDER — DIPHENHYDRAMINE HYDROCHLORIDE 50 MG/ML
50 INJECTION INTRAMUSCULAR; INTRAVENOUS AS NEEDED
OUTPATIENT
Start: 2024-11-18

## 2024-11-19 ENCOUNTER — PHARMACY VISIT (OUTPATIENT)
Dept: PHARMACY | Facility: CLINIC | Age: 39
End: 2024-11-19
Payer: COMMERCIAL

## 2024-11-25 ENCOUNTER — APPOINTMENT (OUTPATIENT)
Dept: RADIOLOGY | Facility: HOSPITAL | Age: 39
End: 2024-11-25
Payer: COMMERCIAL

## 2024-11-25 RX ORDER — RISANKIZUMAB-RZAA 60 MG/ML
600 INJECTION INTRAVENOUS
Qty: 10 ML | Refills: 2 | Status: SHIPPED
Start: 2024-11-25

## 2024-11-26 ENCOUNTER — DOCUMENTATION (OUTPATIENT)
Dept: INFUSION THERAPY | Facility: CLINIC | Age: 39
End: 2024-11-26
Payer: COMMERCIAL

## 2024-11-26 LAB
LABORATORY COMMENT REPORT: NORMAL
PATH REPORT.FINAL DX SPEC: NORMAL
PATH REPORT.GROSS SPEC: NORMAL
PATH REPORT.TOTAL CANCER: NORMAL

## 2024-11-26 NOTE — PROGRESS NOTES
CLINICAL CLEARANCE FOR OUTPATIENT INFUSION      Patient to be scheduled forNew Start  of Skyrizi   For Diagnosis: Ulcerative Colitis    Dosed: Induction      Labs required prior to treatment (place lab orders if not already ordered or completed):  TB Drawn/Results:   Lab Results   Component Value Date    TBSIN Negative 11/10/2023      (Must be negative)  Hep B / Hep C Drawn/Results:   Lab Results   Component Value Date    HEPBCAB NONREACTIVE 01/10/2020    HEPCAB Nonreactive 11/10/2023      Lab Results   Component Value Date    HEPBSAG Nonreactive 11/10/2023          Baseline labs for review:  CBC-D Drawn:    Lab Results   Component Value Date    WBC 4.0 (L) 09/17/2024    HGB 13.3 (L) 09/17/2024    HCT 40.8 (L) 09/17/2024    MCV 86 09/17/2024     09/17/2024    (baseline)  CMP Drawn:     Chemistry    Lab Results   Component Value Date/Time     04/10/2024 0924    K 3.5 04/10/2024 0924     04/10/2024 0924    CO2 29 04/10/2024 0924    BUN 10 04/10/2024 0924    CREATININE 0.78 04/10/2024 0924    Lab Results   Component Value Date/Time    CALCIUM 9.2 04/10/2024 0924    ALKPHOS 66 09/17/2024 0725    AST 13 09/17/2024 0725    ALT 21 09/17/2024 0725    BILITOT 0.6 09/17/2024 0725          (baseline)  Liver Enzymes / Bilirubin Drawn/Results:   Lab Results   Component Value Date    ALT 21 09/17/2024    AST 13 09/17/2024    ALKPHOS 66 09/17/2024    BILITOT 0.6 09/17/2024     (baseline and during treatment)    AST: 13    ALT: 21  Lab Results   Component Value Date    ALT 21 09/17/2024       Total Bilirubin: 0.6  Lab Results   Component Value Date    ALT 21 09/17/2024    AST 13 09/17/2024    ALKPHOS 66 09/17/2024    BILITOT 0.6 09/17/2024       WNL? Yes  (If elevated discuss with prescribing provider prior to proceeding)        Pregnancy test ordered prior to first infusion for patients of childbearing ability? No   (If NO please enter order)    Any history of malignancy especially skin cancer? No   (If YES  discuss with prescribing provider prior to proceeding)    Past Medical History:   Diagnosis Date    Arthritis     Bicuspid aortic valve     Disease of thyroid gland     Kidney stones     Ulcerative colitis       Patient Active Problem List   Diagnosis    Acquired claw toe of left foot    Bicuspid aortic valve determined by imaging    Bright red blood per rectum    Cardiac murmur    Diarrhea of presumed infectious origin    Enteropathic arthritis    Facet arthropathy, lumbar    Finger injury    Hallux rigidus of both feet    Hemangioma of skin and subcutaneous tissue    Immunocompromised patient    Melanocytic nevi of scalp and neck    Cyst of left kidney    Other hypertrophic disorders of the skin    Sacroiliitis (CMS-HCC)    Enthesopathy    Subclinical hyperthyroidism    Swelling of finger, left    Synovitis    Ulcerative colitis, rectosigmoid, unspecified complication (Multi)    Ulcerative proctitis with rectal bleeding (Multi)    Vitamin D deficiency        Last infusion/injection received: na (if continuation)    Due: anytime    Okay to schedule for treatment as ordered by prescribing provider

## 2024-11-29 ENCOUNTER — HOSPITAL ENCOUNTER (OUTPATIENT)
Dept: RADIOLOGY | Facility: HOSPITAL | Age: 39
Discharge: HOME | End: 2024-11-29
Payer: COMMERCIAL

## 2024-11-29 DIAGNOSIS — N20.0 NEPHROLITHIASIS: ICD-10-CM

## 2024-11-29 PROCEDURE — 76770 US EXAM ABDO BACK WALL COMP: CPT

## 2024-12-10 ENCOUNTER — APPOINTMENT (OUTPATIENT)
Dept: INFUSION THERAPY | Facility: CLINIC | Age: 39
End: 2024-12-10
Payer: COMMERCIAL

## 2024-12-10 VITALS
SYSTOLIC BLOOD PRESSURE: 119 MMHG | BODY MASS INDEX: 24.77 KG/M2 | HEART RATE: 69 BPM | WEIGHT: 162.92 LBS | TEMPERATURE: 98.1 F | DIASTOLIC BLOOD PRESSURE: 77 MMHG | OXYGEN SATURATION: 98 % | RESPIRATION RATE: 16 BRPM

## 2024-12-10 DIAGNOSIS — K51.319 ULCERATIVE COLITIS, RECTOSIGMOID, UNSPECIFIED COMPLICATION (MULTI): ICD-10-CM

## 2024-12-10 PROCEDURE — 96365 THER/PROPH/DIAG IV INF INIT: CPT | Performed by: NURSE PRACTITIONER

## 2024-12-10 RX ORDER — ALBUTEROL SULFATE 0.83 MG/ML
3 SOLUTION RESPIRATORY (INHALATION) AS NEEDED
OUTPATIENT
Start: 2025-01-07

## 2024-12-10 RX ORDER — FAMOTIDINE 10 MG/ML
20 INJECTION INTRAVENOUS ONCE AS NEEDED
OUTPATIENT
Start: 2025-01-07

## 2024-12-10 RX ORDER — DIPHENHYDRAMINE HYDROCHLORIDE 50 MG/ML
50 INJECTION INTRAMUSCULAR; INTRAVENOUS AS NEEDED
OUTPATIENT
Start: 2025-01-07

## 2024-12-10 RX ORDER — EPINEPHRINE 0.3 MG/.3ML
0.3 INJECTION SUBCUTANEOUS EVERY 5 MIN PRN
OUTPATIENT
Start: 2025-01-07

## 2024-12-10 ASSESSMENT — ENCOUNTER SYMPTOMS
PALPITATIONS: 0
NERVOUS/ANXIOUS: 0
FREQUENCY: 0
ABDOMINAL PAIN: 0
SHORTNESS OF BREATH: 0
TROUBLE SWALLOWING: 0
NUMBNESS: 0
APPETITE CHANGE: 0
DYSURIA: 0
LEG SWELLING: 0
EYE PROBLEMS: 0
BLOOD IN STOOL: 0
WHEEZING: 0
DIARRHEA: 1
FEVER: 0
EXTREMITY WEAKNESS: 0
NAUSEA: 0
VOMITING: 0
VOICE CHANGE: 0
HEMATURIA: 0
DIZZINESS: 0
UNEXPECTED WEIGHT CHANGE: 0
SORE THROAT: 0
HEADACHES: 0
CHILLS: 0
FATIGUE: 0
WOUND: 0
COUGH: 0
LIGHT-HEADEDNESS: 0
MYALGIAS: 0
DEPRESSION: 0
ARTHRALGIAS: 0
CONSTIPATION: 0

## 2024-12-10 NOTE — PATIENT INSTRUCTIONS
Today :We administered risankizumab-rzaa (Skyrizi) 600 mg in dextrose 5% 260 mL IV.     For:   1. Ulcerative colitis, rectosigmoid, unspecified complication (Multi)         Your next appointment is due in:  4 WEEKS        Please read the  Medication Guide that was given to you and reviewed during todays visit.     (Tell all doctors including dentists that you are taking this medication)     Go to the emergency room or call 911 if:  -You have signs of allergic reaction:   -Rash, hives, itching.   -Swollen, blistered, peeling skin.   -Swelling of face, lips, mouth, tongue or throat.   -Tightness of chest, trouble breathing, swallowing or talking     Call your doctor:  - If IV / injection site gets red, warm, swollen, itchy or leaks fluid or pus.     (Leave dressing on your IV site for at least 2 hours and keep area clean and dry  - If you get sick or have symptoms of infection or are not feeling well for any reason.    (Wash your hands often, stay away from people who are sick)  - If you have side effects from your medication that do not go away or are bothersome.     (Refer to the teaching your nurse gave you for side effects to call your doctor about)    - Common side effects may include:  stuffy nose, headache, feeling tired, muscle aches, upset stomach  - Before receiving any vaccines     - Call [facility] at   If:  - You get sick, are on antibiotics, have had a recent vaccine, have surgery or dental work and your doctor wants your visit rescheduled.  - You need to cancel and reschedule your visit for any reason. Call at least 2 days before your visit if you need to cancel.   - Your insurance changes before your next visit.    (We will need to get approval from your new insurance. This can take up to two weeks.)     The [facility] is opened Monday thru Friday. We are closed on weekends and holidays.   Voice mail will take your call if the center is closed. If you leave a  message please allow 24 hours for a call back during weekdays. If you leave a message on a weekend/holiday, we will call you back the next business day.    A pharmacist is available Monday - Friday from 8:30AM to 3:30PM to help answer any questions you may have about your prescriptions(s). Please call pharmacy at:    [facility]: [phone]  [facility]: [phone]  [facility]: [phone]

## 2024-12-10 NOTE — PROGRESS NOTES
[facility]   Infusion Clinic Note   Date: December 10, 2024   Name: Guilherme Ocasio  : 1985   MRN: [id number]          Reason for Visit: OP Infusion (PT HERE FOR FIRST DOSE OR 'WEEK 0' OF SKYRIZI 600 MG INFUSION - NEXT DUE IN 4 WEEKS) and New Patient Visit         Today: We administered risankizumab-rzaa (Skyrizi) 600 mg in dextrose 5% 260 mL IV.       Visit Type: INFUSION       Ordered By: DR. HARRISON       Accompanied by:Self       Diagnosis: Ulcerative colitis, rectosigmoid, unspecified complication (Multi)        Allergies:   Allergies as of 12/10/2024    (No Known Allergies)          Current Medications has a current medication list which includes the following prescription(s): etrasimod, prednisone, skyrizi, and hydrocortisone.       Vitals:   Vitals:    12/10/24 0710 12/10/24 0834   BP: 127/87 119/77   Pulse: 75 69   Resp: 16 16   Temp: 36.3 °C (97.3 °F) 36.7 °C (98.1 °F)   SpO2: 98% 98%   Weight: 73.9 kg (162 lb 14.7 oz)              Infusion Pre-procedure Checklist:   - Allergies reviewed: yes   - Medications reviewed: yes       - Previous reaction to current treatment: n/a      Assess patient for the concerns below. Document provider notification as appropriate.  - Active or recent infection with/without current antibiotic use: no  - Recent or planned invasive dental work: no  - Recent or planned surgeries: no  - Recently received or plans to receive vaccinations: no  - Has treatment related toxicities: no  - Is pregnant:  n/a      Pain: 0   - Is the pain different from normal: n/a   - Is your Doctor aware:  n/a       Labs: N/A          Fall Risk Screening: Schrader Fall Risk  History of Falling, Immediate or Within 3 Months: No  Secondary Diagnosis: No  Ambulatory Aid: Walks without aid/bedrest/nurse assist  Intravenous Therapy/Heparin Lock: Yes  Gait/Transferring: Normal/bedrest/immobile  Mental Status: Oriented to own ability  Schrader Fall Risk Score: 20       Review Of  Systems:  Review of Systems   Constitutional:  Negative for appetite change, chills, fatigue, fever and unexpected weight change.   HENT:   Negative for hearing loss, mouth sores, sore throat, tinnitus, trouble swallowing and voice change.    Eyes:  Negative for eye problems.   Respiratory:  Negative for cough, shortness of breath and wheezing.    Cardiovascular:  Negative for chest pain, leg swelling and palpitations.   Gastrointestinal:  Positive for diarrhea. Negative for abdominal pain, blood in stool, constipation, nausea and vomiting.        ADMITS TO APPROX. 4-5 STOOLS PER DAY, LOOSE IN CONSISTENCY  DENIES BLOOD IN THE STOOL BUT ADMITS TO MUCOUS   DENIES CONSTIPATION BUT ADMITS TO DIARRHEA  DENIES NOCTURNAL STOOLING  DENIES ABDOMINAL PAIN, N/V   Genitourinary:  Negative for dysuria, frequency and hematuria.    Musculoskeletal:  Negative for arthralgias and myalgias.   Skin:  Negative for itching, rash and wound.   Neurological:  Negative for dizziness, extremity weakness, headaches, light-headedness and numbness.   Psychiatric/Behavioral:  Negative for depression. The patient is not nervous/anxious.          ROS completed? Yes      Infusion Readiness:  - Assessment Concerns Related to Infusion: No  - Provider notified: n/a      Document Below Only If Indicated:   New Patient Education:    NEW PATIENT MEDICATION EDUCATION PT PROVIDED WITH WRITTEN ("[employer] PT EDUCATION SHEET) AND VERBAL EDUCATION REGARDING MEDICATION GIVEN. VERIFIED MEDICATION NAME WITH PATIENT AND DISCUSSED REASON FOR USE. BRIEFLY DISCUSSED HOW MEDICATION WORKS AND EDUCATED ON GOAL OF TREATMENT, FREQUENCY OF TREATMENT, ADVERSE RXN'S AND COMMON SIDE EFFECTS TO MONITOR FOR. INSTRUCTED PT TO ASSURE THAT ALL PROVIDERS INCLUDING DENTISTS ARE AWARE OF MEDICATION RECEIVED. DISCUSSED FLOW OF VISIT AND ORIENTED TO INFUSION CENTER. PT VERBALIZES UNDERSTANDING. CALL LIGHT PROVIDED AND PT AWARE TO ALERT STAFF OF ANY CONCERNS DURING TREATMENT.       "  Treatment Conditions & Drug Specific Questions:    Risankizumab  (SKYRIZI)   (Unless otherwise specified on patient specific therapy plan):     TREATMENT CONDITIONS:   Unless otherwise specified on patient specific therapy plan HOLD and notify provider prior to proceeding with treatment if:   o Positive Hepatitis B Surface Ag  o Positive T-Spot  o Signs or symptoms of hepatotoxicity  o Elevated AST / ALT  o Total Bilirubin GREATER THAN 2.2 TIMES ULN  - Positive Pregnancy    Lab Results   Component Value Date    HEPBSAG Nonreactive 11/10/2023      Lab Results   Component Value Date    TBSIN Negative 11/10/2023      No results found for: \"NONUHFIRE\", \"NONUHSWGH\", \"NONUHFISH\", \"EXTHEPBSAG\"    Chemistry    Lab Results   Component Value Date/Time     04/10/2024 0924    K 3.5 04/10/2024 0924     04/10/2024 0924    CO2 29 04/10/2024 0924    BUN 10 04/10/2024 0924    CREATININE 0.78 04/10/2024 0924    Lab Results   Component Value Date/Time    CALCIUM 9.2 04/10/2024 0924    ALKPHOS 66 09/17/2024 0725    AST 13 09/17/2024 0725    ALT 21 09/17/2024 0725    BILITOT 0.6 09/17/2024 0725          Lab Results   Component Value Date    ALT 21 09/17/2024    AST 13 09/17/2024    ALKPHOS 66 09/17/2024    BILITOT 0.6 09/17/2024        Labs reviewed and patient meets treatment conditions? Yes    REMINDER:  *Negative pregnancy test prior to first infusion in patients of childbearing ability.     Infusion 1: Provide patient with [email or web address] patient education sheet and review. Assist pt in connecting with nurse navigator if not already connected (pt to call [phone]).    Infusion 2: Assure patient is established with nurse navigator in the community. Have patient watch [employer] OBI instructional video. ([email or web address]-training-video)    Infusion 3: Complete on body injection training. Instruct patient on timing of first self injection. Typically due 4 " weeks from 3rd infusion and then every 8 weeks thereafter (1st injection due 12 weeks from week 0 infusion). Refer to individual script.          - Completed? Yes. Week 1 training complete    Recommended Vitals/Observation:  Vitals: Obtain vital signs at start and end of infusion.   Observation: Patient may leave immediately after        Weight Based Drug Calculations:    WEIGHT BASED DRUGS: NOT APPLICABLE / FLAT DOSE          Initiated By: Georgia Carvalho RN

## 2024-12-11 ENCOUNTER — HOSPITAL ENCOUNTER (OUTPATIENT)
Dept: CARDIOLOGY | Facility: HOSPITAL | Age: 39
Discharge: HOME | End: 2024-12-11
Payer: COMMERCIAL

## 2024-12-11 DIAGNOSIS — Q23.81 BICUSPID AORTIC VALVE: ICD-10-CM

## 2024-12-11 DIAGNOSIS — Q23.88 OTHER CONGENITAL MALFORMATIONS OF AORTIC AND MITRAL VALVES: ICD-10-CM

## 2024-12-11 PROCEDURE — 93306 TTE W/DOPPLER COMPLETE: CPT

## 2024-12-11 PROCEDURE — 93306 TTE W/DOPPLER COMPLETE: CPT | Performed by: STUDENT IN AN ORGANIZED HEALTH CARE EDUCATION/TRAINING PROGRAM

## 2024-12-12 LAB
AORTIC VALVE PEAK VELOCITY: 1.79 M/S
AV PEAK GRADIENT: 13 MMHG
AVA (PEAK VEL): 2.93 CM2
EJECTION FRACTION APICAL 4 CHAMBER: 58.7
EJECTION FRACTION: 58 %
LEFT VENTRICLE INTERNAL DIMENSION DIASTOLE: 4.43 CM (ref 3.5–6)
LEFT VENTRICULAR OUTFLOW TRACT DIAMETER: 2.21 CM
MITRAL VALVE E/A RATIO: 1.16
RIGHT VENTRICLE FREE WALL PEAK S': 14 CM/S

## 2024-12-14 ENCOUNTER — LAB (OUTPATIENT)
Dept: LAB | Facility: LAB | Age: 39
End: 2024-12-14
Payer: COMMERCIAL

## 2024-12-14 DIAGNOSIS — K51.211 ULCERATIVE PROCTITIS WITH RECTAL BLEEDING (MULTI): ICD-10-CM

## 2024-12-14 LAB
ALBUMIN SERPL BCP-MCNC: 4.1 G/DL (ref 3.4–5)
ALP SERPL-CCNC: 64 U/L (ref 33–120)
ALT SERPL W P-5'-P-CCNC: 30 U/L (ref 10–52)
AST SERPL W P-5'-P-CCNC: 16 U/L (ref 9–39)
BASOPHILS # BLD AUTO: 0.01 X10*3/UL (ref 0–0.1)
BASOPHILS NFR BLD AUTO: 0.3 %
BILIRUB DIRECT SERPL-MCNC: 0.1 MG/DL (ref 0–0.3)
BILIRUB SERPL-MCNC: 0.6 MG/DL (ref 0–1.2)
CRP SERPL-MCNC: 0.14 MG/DL
EOSINOPHIL # BLD AUTO: 0.04 X10*3/UL (ref 0–0.7)
EOSINOPHIL NFR BLD AUTO: 1.2 %
ERYTHROCYTE [DISTWIDTH] IN BLOOD BY AUTOMATED COUNT: 12.5 % (ref 11.5–14.5)
HCT VFR BLD AUTO: 40 % (ref 41–52)
HGB BLD-MCNC: 13.1 G/DL (ref 13.5–17.5)
IMM GRANULOCYTES # BLD AUTO: 0.01 X10*3/UL (ref 0–0.7)
IMM GRANULOCYTES NFR BLD AUTO: 0.3 % (ref 0–0.9)
LYMPHOCYTES # BLD AUTO: 0.4 X10*3/UL (ref 1.2–4.8)
LYMPHOCYTES NFR BLD AUTO: 11.6 %
MCH RBC QN AUTO: 27.2 PG (ref 26–34)
MCHC RBC AUTO-ENTMCNC: 32.8 G/DL (ref 32–36)
MCV RBC AUTO: 83 FL (ref 80–100)
MONOCYTES # BLD AUTO: 0.6 X10*3/UL (ref 0.1–1)
MONOCYTES NFR BLD AUTO: 17.4 %
NEUTROPHILS # BLD AUTO: 2.38 X10*3/UL (ref 1.2–7.7)
NEUTROPHILS NFR BLD AUTO: 69.2 %
NRBC BLD-RTO: 0 /100 WBCS (ref 0–0)
PLATELET # BLD AUTO: 196 X10*3/UL (ref 150–450)
PROT SERPL-MCNC: 6.9 G/DL (ref 6.4–8.2)
RBC # BLD AUTO: 4.81 X10*6/UL (ref 4.5–5.9)
WBC # BLD AUTO: 3.4 X10*3/UL (ref 4.4–11.3)

## 2024-12-14 PROCEDURE — 85025 COMPLETE CBC W/AUTO DIFF WBC: CPT

## 2024-12-14 PROCEDURE — 80076 HEPATIC FUNCTION PANEL: CPT

## 2024-12-14 PROCEDURE — 86140 C-REACTIVE PROTEIN: CPT

## 2024-12-14 PROCEDURE — 36415 COLL VENOUS BLD VENIPUNCTURE: CPT

## 2024-12-16 NOTE — PROGRESS NOTES
REASON FOR VISIT:  ulcerative colitis    HPI:  Guilherme Ocasio is a 39 y.o. male who presents for follow-up.  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       -h/o bicuspid aortic valve  -nephrolithiasis     9/2023 flex sig with Gale 2 colitis to distal sigmoid.       Difficult to control disease.  Has tried and failed Humira, Remicade, Entyvio, tofacitinib, and upadacitinib (started 1/2024, but induction interrupted with PNA) and topical steroids.      7/2024 seen and not doing well after travel to [facility] in June.  Decision made to give a course of prednisone and transition to etrasimod 2 mg daily.  Started etrasimod 8/2024.    9/17/24  (improved)    Seen late 9/2024 and prednisone at 10 mg daily.  At 15 mg daily was good with 1-3 daily with no blood.  At 10 mg daily was seeing blood rarely, every few days.  Stools varied from 3-6 daily.  Stool still formed, but the greater the frequency, then the looser stools get.  Still using hydrocortisone most nights. No nocturnal stools.  Still urgency with just 1-2 minutes to get to bathroom.  Joints fine.  Plan was to taper prednisone off.     11/2024 F/S  showed persistent proctosigmoiditis with Gale 3 disease in the distal 3-5 cm of the rectum with ulceration and otherwise Gale 2 disease in rectum and sigmoid.  Descending colon remained normal endoscopically.  Path c/w UC; no CMV.  Overall, was feeling worse off of prednisone.  Stools up to 8-10 daily.  FCP > 3000.  Also with some joint discomfort and some oral aphthous ulcers on tongue and gums.  No nocturnal stools.  (+) urgency (+) blood about 2-3 times per week.  Tolerating etrasimod without difficulty.  Gave him prednisone again 40 mg x 14 days then taper 5 mg per week.  Continue etrasimod (gets via patient program for free) and add rizankizumab.    In follow-up today received risankizumab, first dose 12/10/24.  Prednisone at 25 mg daily.  No longer taking HC suppositories, Velsipity 2 mg  daily.  Feels well.  Frequency 3-4 daily.  No blood in stool; stopped a couple of days after started prednisone.  Still with urgency, has 2-3 minutes before needs to get to BR.  Stool formed. No nocturnal stools.  No abd pain.  Energy good.  No real side effects at prednisone higher doses.  Eating well; weight stable.  Next Skyrizi in 1/7/24 and 2/10/24.     REVIEW OF SYSTEMS  No trouble swallowing.  Joints feel good.  Occasional explosive diarrhea every 2-3 weeks.  Complete review of systems otherwise negative per complaint    No Known Allergies    Past Medical History:   Diagnosis Date    Arthritis     Bicuspid aortic valve     Disease of thyroid gland     Kidney stones     Ulcerative colitis        Past Surgical History:   Procedure Laterality Date    COLONOSCOPY      OTHER SURGICAL HISTORY  07/27/2016    Cystoscopy With Insertion Of Ureteral Stent Right    OTHER SURGICAL HISTORY  07/27/2016    Lithotripsy       Current Outpatient Medications   Medication Sig Dispense Refill    etrasimod 2 mg tablet Take 2 mg by mouth once daily.      hydrocortisone (Anusol-HC) 25 mg suppository Insert 1 suppository (25 mg) into the rectum once daily. Take prior to procedure as directed (Patient not taking: Reported on 12/10/2024) 30 suppository 3    predniSONE (Deltasone) 10 mg tablet Take 4 tablets once daily for 14 days then lower by 1/2 tablet every week until off prednisone 112 tablet 1    risankizumab-rzaa (Skyrizi) 60 mg/mL solution injection Infuse 10 mL (600 mg total) into a venous catheter every 28 (twenty-eight) days.  at week 0, 4 and 8 10 mL 2     No current facility-administered medications for this visit.       PHYSICAL EXAM:  /79   Pulse 78   Temp 36.5 °C (97.7 °F)   Wt 74.1 kg (163 lb 4.8 oz)   SpO2 96%   BMI 24.83 kg/m²   Alert in NAD  anicteric    Lab Results   Component Value Date    WBC 3.4 (L) 12/14/2024    HGB 13.1 (L) 12/14/2024    HCT 40.0 (L) 12/14/2024    MCV 83 12/14/2024      12/14/2024     Lab Results   Component Value Date    ALT 30 12/14/2024    AST 16 12/14/2024    ALKPHOS 64 12/14/2024    BILITOT 0.6 12/14/2024 9/17/2024 Fecal calprotectin 570    === 04/10/24 ===    CT ANGIO CHEST FOR PULMONARY EMBOLISM    - Impression -  No evidence of pulmonary embolism.  Right lower lobe pneumonia.    MACRO:  None    Signed by: Evans Tan 4/10/2024 1:56 PM  Dictation workstation:   [id number]      ASSESSMENT  #UC-clinically improved on tapering prednisone, etrasimod, and has received 1 dose of risankizumab.  Still has some urgency, but frequency is improved and blood has resolved.  He is off of hydrocortisone rectal suppositories for the first time in approximately 6 months.    We will plan to slowly taper prednisone as he completes induction with risankizumab.  Will see him back after he completes induction to see how he is doing.  He will let us know before then if any symptom worsening as steroids are tapered    #Joint discomfort-this is currently resolved on therapy.    PLAN  1) continue Etrasimod 1 tablet daily  2) lower prednisone to 20 mg daily then hold at 20 mg until you receive your second dose of risankizumab.  Then, taper to 15 mg for 2 weeks and, if you remain well, after 2 weeks lowered to 10 mg daily.  Stay at 10 mg daily until you receive your third (final) induction dose of intravenous risankizumab.  If after completing the risankizumab induction you are still feeling well on 10 mg of prednisone, lower to 5 mg daily for 1 week, then 5 mg every other day for 1 week, then stop prednisone  3) follow-up on either 2/18/2025 or 2/25/2025  4) contact the office with any worsening symptoms as prednisone is tapered

## 2024-12-17 ENCOUNTER — PREP FOR PROCEDURE (OUTPATIENT)
Dept: GASTROENTEROLOGY | Facility: HOSPITAL | Age: 39
End: 2024-12-17

## 2024-12-17 ENCOUNTER — OFFICE VISIT (OUTPATIENT)
Dept: GASTROENTEROLOGY | Facility: HOSPITAL | Age: 39
End: 2024-12-17
Payer: COMMERCIAL

## 2024-12-17 VITALS
BODY MASS INDEX: 24.83 KG/M2 | DIASTOLIC BLOOD PRESSURE: 79 MMHG | OXYGEN SATURATION: 96 % | WEIGHT: 163.3 LBS | SYSTOLIC BLOOD PRESSURE: 134 MMHG | HEART RATE: 78 BPM | TEMPERATURE: 97.7 F

## 2024-12-17 DIAGNOSIS — K51.211 ULCERATIVE PROCTITIS WITH RECTAL BLEEDING (MULTI): Primary | ICD-10-CM

## 2024-12-17 PROCEDURE — 99213 OFFICE O/P EST LOW 20 MIN: CPT | Performed by: INTERNAL MEDICINE

## 2024-12-17 PROCEDURE — 1036F TOBACCO NON-USER: CPT | Performed by: INTERNAL MEDICINE

## 2024-12-17 SDOH — ECONOMIC STABILITY: FOOD INSECURITY: WITHIN THE PAST 12 MONTHS, YOU WORRIED THAT YOUR FOOD WOULD RUN OUT BEFORE YOU GOT MONEY TO BUY MORE.: NEVER TRUE

## 2024-12-17 SDOH — ECONOMIC STABILITY: FOOD INSECURITY: WITHIN THE PAST 12 MONTHS, THE FOOD YOU BOUGHT JUST DIDN'T LAST AND YOU DIDN'T HAVE MONEY TO GET MORE.: NEVER TRUE

## 2024-12-17 ASSESSMENT — PATIENT HEALTH QUESTIONNAIRE - PHQ9
2. FEELING DOWN, DEPRESSED OR HOPELESS: NOT AT ALL
1. LITTLE INTEREST OR PLEASURE IN DOING THINGS: NOT AT ALL
SUM OF ALL RESPONSES TO PHQ9 QUESTIONS 1 & 2: 0

## 2024-12-17 ASSESSMENT — PAIN SCALES - GENERAL: PAINLEVEL_OUTOF10: 0-NO PAIN

## 2024-12-17 NOTE — PATIENT INSTRUCTIONS
I'm glad that you are feeling better.  We will slowly taper prednisone as ,you complete Skyrizi induction.  As discussed today,    PLAN  1) continue Etrasimod 1 tablet daily  2) lower prednisone to 20 mg daily then hold at 20 mg until you receive your second dose of risankizumab.    -Then, taper to 15 mg for 2 weeks and, if you remain well, after 2 weeks lowered to 10 mg daily.    -Stay at 10 mg daily until you receive your third (final) induction dose of intravenous risankizumab.  -If after completing the risankizumab induction you are still feeling well on 10 mg of prednisone, lower to 5 mg daily for 1 week, then 5 mg every other day for 1 week, then stop prednisone  3) follow-up on either 2/18/2025 or 2/25/2025  4) contact the office with any worsening symptoms as prednisone is tapered

## 2024-12-21 ENCOUNTER — OFFICE VISIT (OUTPATIENT)
Dept: URGENT CARE | Age: 39
End: 2024-12-21
Payer: COMMERCIAL

## 2024-12-21 VITALS
BODY MASS INDEX: 24.25 KG/M2 | WEIGHT: 160 LBS | SYSTOLIC BLOOD PRESSURE: 142 MMHG | TEMPERATURE: 98.1 F | OXYGEN SATURATION: 97 % | DIASTOLIC BLOOD PRESSURE: 81 MMHG | HEART RATE: 88 BPM | RESPIRATION RATE: 18 BRPM | HEIGHT: 68 IN

## 2024-12-21 DIAGNOSIS — R09.81 NASAL CONGESTION: ICD-10-CM

## 2024-12-21 DIAGNOSIS — J02.0 STREP PHARYNGITIS: Primary | ICD-10-CM

## 2024-12-21 DIAGNOSIS — J02.9 SORE THROAT: ICD-10-CM

## 2024-12-21 LAB
POC RAPID INFLUENZA A: NEGATIVE
POC RAPID INFLUENZA B: NEGATIVE
POC RAPID STREP: POSITIVE
POC SARS-COV-2 AG BINAX: NORMAL

## 2024-12-21 PROCEDURE — RXMED WILLOW AMBULATORY MEDICATION CHARGE

## 2024-12-21 RX ORDER — AMOXICILLIN 500 MG/1
500 CAPSULE ORAL 2 TIMES DAILY
Qty: 20 CAPSULE | Refills: 0 | Status: SHIPPED | OUTPATIENT
Start: 2024-12-21 | End: 2024-12-31

## 2024-12-21 ASSESSMENT — ENCOUNTER SYMPTOMS
SORE THROAT: 1
COUGH: 1

## 2024-12-21 NOTE — PROGRESS NOTES
Subjective   Patient ID: Guilherme Ocasio is a 39 y.o. male. They present today with a chief complaint of Sore Throat, Nasal Congestion, and Cough (Symptoms for 3 days. ).    History of Present Illness  Patient is a 39-year-old male with history of arthritis, disease of thyroid gland and ulcerative colitis who presents urgent care today with a complaint of flulike symptoms.  He states his symptoms started 3 days ago.  He notes recently being exposed to somebody infected with the flu.  Specifically he endorses nasal congestion, sore throat and cough.  He denies any fevers, chills, nausea, vomiting, chest pain or shortness of breath.  No other complaints or concerns mention this time.      History provided by:  Patient  Sore Throat   Associated symptoms include congestion and coughing.   Cough  Associated symptoms include a sore throat.       Past Medical History  Allergies as of 12/21/2024    (No Known Allergies)       (Not in a hospital admission)         Past Medical History:   Diagnosis Date    Arthritis     Bicuspid aortic valve     Disease of thyroid gland     Kidney stones     Ulcerative colitis        Past Surgical History:   Procedure Laterality Date    COLONOSCOPY      OTHER SURGICAL HISTORY  07/27/2016    Cystoscopy With Insertion Of Ureteral Stent Right    OTHER SURGICAL HISTORY  07/27/2016    Lithotripsy        reports that he has never smoked. He has never been exposed to tobacco smoke. He has never used smokeless tobacco. He reports that he does not currently use alcohol. He reports that he does not use drugs.    Review of Systems  Review of Systems   HENT:  Positive for congestion and sore throat.    Respiratory:  Positive for cough.                                   Objective    Vitals:    12/21/24 0852   BP: 142/81   BP Location: Left arm   Patient Position: Sitting   BP Cuff Size: Adult   Pulse: 88   Resp: 18   Temp: 36.7 °C (98.1 °F)   TempSrc: Oral   SpO2: 97%   Weight: 72.6 kg (160 lb)   Height:  "1.727 m (5' 8\")     No LMP for male patient.    Physical Exam  Vitals and nursing note reviewed.   Constitutional:       General: He is not in acute distress.     Appearance: Normal appearance. He is not ill-appearing, toxic-appearing or diaphoretic.   HENT:      Head: Normocephalic and atraumatic.      Nose: Congestion present.      Mouth/Throat:      Mouth: Mucous membranes are moist.      Pharynx: Posterior oropharyngeal erythema present. No oropharyngeal exudate.   Eyes:      Extraocular Movements: Extraocular movements intact.      Conjunctiva/sclera: Conjunctivae normal.      Pupils: Pupils are equal, round, and reactive to light.   Cardiovascular:      Rate and Rhythm: Normal rate and regular rhythm.      Pulses: Normal pulses.      Heart sounds: Normal heart sounds.   Pulmonary:      Effort: Pulmonary effort is normal. No respiratory distress.      Breath sounds: Normal breath sounds. No stridor. No wheezing, rhonchi or rales.   Chest:      Chest wall: No tenderness.   Musculoskeletal:         General: Normal range of motion.      Cervical back: Normal range of motion and neck supple.   Skin:     General: Skin is warm and dry.      Capillary Refill: Capillary refill takes less than 2 seconds.   Neurological:      General: No focal deficit present.      Mental Status: He is alert and oriented to person, place, and time.   Psychiatric:         Mood and Affect: Mood normal.         Behavior: Behavior normal.         Procedures      Assessment/Plan   Allergies, medications, history, and pertinent labs/EKGs/Imaging reviewed by KELLY Orr.     Medical Decision Making    Patient is well appearing, afebrile, non toxic, not hypoxic, and appropriate for outpatient treatment and management at time of evaluation. Patient presents with flulike symptoms x 3 days as described above.     Differential includes but not limited to: COVID, influenza, streptococcal pharyngitis, other    On exam, patient has " bilateral tonsillar erythema without significant swelling and no appreciable exudate.  He does have moderate nasal congestion.  Lung sounds are clear in all fields bilaterally.    Rapid COVID and flu are negative.  Rapid strep is positive.  I discussed these findings with the patient.  A prescription for amoxicillin called into his pharmacy to be used as directed.  Also recommended close follow-up with PCP.  He understands red flags and ER precautions.  He was discharged in stable condition.  All questions and concerns addressed.           Orders and Diagnoses  Diagnoses and all orders for this visit:  Strep pharyngitis  -     amoxicillin (Amoxil) 500 mg capsule; Take 1 capsule (500 mg) by mouth 2 times a day for 10 days.  Sore throat  -     POCT Covid-19 Rapid Antigen  -     POCT Influenza A/B manually resulted  -     POCT rapid strep A manually resulted  Nasal congestion      Medical Admin Record      Follow Up Instructions  No follow-ups on file.    Patient disposition: Home    Electronically signed by KELLY Orr  9:16 AM

## 2024-12-23 ENCOUNTER — PHARMACY VISIT (OUTPATIENT)
Dept: PHARMACY | Facility: CLINIC | Age: 39
End: 2024-12-23
Payer: COMMERCIAL

## 2024-12-27 ENCOUNTER — OFFICE VISIT (OUTPATIENT)
Dept: PRIMARY CARE | Facility: CLINIC | Age: 39
End: 2024-12-27
Payer: COMMERCIAL

## 2024-12-27 VITALS — DIASTOLIC BLOOD PRESSURE: 72 MMHG | SYSTOLIC BLOOD PRESSURE: 114 MMHG

## 2024-12-27 DIAGNOSIS — K51.319 ULCERATIVE COLITIS, RECTOSIGMOID, UNSPECIFIED COMPLICATION (MULTI): ICD-10-CM

## 2024-12-27 DIAGNOSIS — J01.00 ACUTE NON-RECURRENT MAXILLARY SINUSITIS: Primary | ICD-10-CM

## 2024-12-27 PROCEDURE — 99213 OFFICE O/P EST LOW 20 MIN: CPT | Performed by: INTERNAL MEDICINE

## 2024-12-27 NOTE — PROGRESS NOTES
Subjective   Patient ID: Guilherme Ocasio is a 39 y.o. male who presents for No chief complaint on file..    HPI sick visit no chest pain no shortness of breath no side effect with medication however had been in the emergency room last week had tested positive for strep but not having sore throat or fever or exudate with given amoxicillin tested negative for viral illness but now with the sinus discharge yellow has been on antiimmune agents for ulcerative colitis including prednisone oral antiimmune and started new infusion last week    Review of Systems    Objective   There were no vitals taken for this visit.    Physical Exam vital signs noted alert and oriented x 3 NCAT no coryza nares heavy discharge white especially from the right sinus OP benign TM normal bilateral EAC clear bilateral no AC nodes no JVD chest clear to auscultation with some tracheal bronchial cough CV regular rate and rhythm S1-S2 no murmur gallop or rub extremities no clubbing cyanosis or edema normal distal pulses    Assessment/Plan impression acute maxillary sinusitis ulcerative colitis  Plan is concerned about the antibiotics and C. difficile will obtain C. difficile if needed after antibiotic course he has leftover 500 mg Amoxil twice daily directly after his ER visit last week try for 5 days probiotics to follow Tylenol as needed continue with his other medications follow-up with gastroenterology good overall diet good hydration sinus medicine if needed such as Zyrtec 10 mg p.o. daily and then recheck and follow-up with Dr. Mago Cordova          English

## 2024-12-28 ENCOUNTER — PHARMACY VISIT (OUTPATIENT)
Dept: PHARMACY | Facility: CLINIC | Age: 39
End: 2024-12-28
Payer: COMMERCIAL

## 2025-01-07 ENCOUNTER — APPOINTMENT (OUTPATIENT)
Dept: INFUSION THERAPY | Facility: CLINIC | Age: 40
End: 2025-01-07
Payer: COMMERCIAL

## 2025-01-07 VITALS
BODY MASS INDEX: 24.91 KG/M2 | OXYGEN SATURATION: 99 % | HEART RATE: 74 BPM | TEMPERATURE: 97.6 F | WEIGHT: 163.8 LBS | DIASTOLIC BLOOD PRESSURE: 82 MMHG | SYSTOLIC BLOOD PRESSURE: 127 MMHG | RESPIRATION RATE: 18 BRPM

## 2025-01-07 DIAGNOSIS — K51.319 ULCERATIVE COLITIS, RECTOSIGMOID, UNSPECIFIED COMPLICATION (MULTI): ICD-10-CM

## 2025-01-07 PROCEDURE — 96365 THER/PROPH/DIAG IV INF INIT: CPT | Performed by: NURSE PRACTITIONER

## 2025-01-07 RX ORDER — ALBUTEROL SULFATE 0.83 MG/ML
3 SOLUTION RESPIRATORY (INHALATION) AS NEEDED
OUTPATIENT
Start: 2025-02-04

## 2025-01-07 RX ORDER — FAMOTIDINE 10 MG/ML
20 INJECTION INTRAVENOUS ONCE AS NEEDED
OUTPATIENT
Start: 2025-02-04

## 2025-01-07 RX ORDER — EPINEPHRINE 0.3 MG/.3ML
0.3 INJECTION SUBCUTANEOUS EVERY 5 MIN PRN
OUTPATIENT
Start: 2025-02-04

## 2025-01-07 RX ORDER — DIPHENHYDRAMINE HYDROCHLORIDE 50 MG/ML
50 INJECTION INTRAMUSCULAR; INTRAVENOUS AS NEEDED
OUTPATIENT
Start: 2025-02-04

## 2025-01-07 ASSESSMENT — ENCOUNTER SYMPTOMS
FEVER: 0
HEADACHES: 0
ABDOMINAL PAIN: 0
FREQUENCY: 0
VOICE CHANGE: 0
APPETITE CHANGE: 0
HEMATURIA: 0
DIARRHEA: 1
DEPRESSION: 0
COUGH: 1
NERVOUS/ANXIOUS: 0
FATIGUE: 0
LEG SWELLING: 0
NUMBNESS: 0
SORE THROAT: 0
MYALGIAS: 0
EXTREMITY WEAKNESS: 0
BLOOD IN STOOL: 1
VOMITING: 0
CHILLS: 0
TROUBLE SWALLOWING: 0
ARTHRALGIAS: 0
WHEEZING: 0
UNEXPECTED WEIGHT CHANGE: 0
DIZZINESS: 0
LIGHT-HEADEDNESS: 0
EYE PROBLEMS: 0
CONSTIPATION: 0
WOUND: 0
DYSURIA: 0
PALPITATIONS: 0
SHORTNESS OF BREATH: 0
NAUSEA: 0

## 2025-01-07 NOTE — PROGRESS NOTES
[facility]   Infusion Clinic Note   Date: 2025   Name: Guilherme Ocasio  : 1985   MRN: [id number]          Reason for Visit: OP Infusion and Follow-up (PT HERE FOR SKYRIZI 600 MG INFUSION/NEXT APPT: 28 DAYS)         Today: We administered risankizumab-rzaa (Skyrizi) 600 mg in dextrose 5% 260 mL IV.       Visit Type: INFUSION       Ordered By: DR HARRISON       Accompanied by:Self       Diagnosis: Ulcerative colitis, rectosigmoid, unspecified complication (Multi)        Allergies:   Allergies as of 2025    (No Known Allergies)          Current Medications has a current medication list which includes the following prescription(s): etrasimod, prednisone, and skyrizi.       Vitals:   Vitals:    25 1502 25 1631   BP: 119/81 127/82   Pulse: 88 74   Resp:  18   Temp: 36.3 °C (97.3 °F) 36.4 °C (97.6 °F)   TempSrc: Temporal    SpO2: 97% 99%   Weight: 74.3 kg (163 lb 12.8 oz)              Infusion Pre-procedure Checklist:   - Allergies reviewed: yes   - Medications reviewed: yes       - Previous reaction to current treatment: no      Assess patient for the concerns below. Document provider notification as appropriate.  - Active or recent infection with/without current antibiotic use: yes, sinus infection. Finished antibiotics on .  - Recent or planned invasive dental work: no  - Recent or planned surgeries: no  - Recently received or plans to receive vaccinations: no  - Has treatment related toxicities: no  - Is pregnant:  n/a      Pain: 0   - Is the pain different from normal: n/a   - Is your Doctor aware:  n/a       Labs: N/A          Fall Risk Screening: Schrader Fall Risk  History of Falling, Immediate or Within 3 Months: No  Secondary Diagnosis: No  Ambulatory Aid: Walks without aid/bedrest/nurse assist  Intravenous Therapy/Heparin Lock: Yes  Gait/Transferring: Normal/bedrest/immobile  Mental Status: Oriented to own ability  Schrader Fall Risk Score: 20       Review  Of Systems:  Review of Systems   Constitutional:  Negative for appetite change, chills, fatigue, fever and unexpected weight change.   HENT:   Negative for hearing loss, mouth sores, sore throat, tinnitus, trouble swallowing and voice change.    Eyes:  Negative for eye problems.   Respiratory:  Positive for cough. Negative for shortness of breath and wheezing.         FROM SINUS INFECTION   Cardiovascular:  Negative for chest pain, leg swelling and palpitations.   Gastrointestinal:  Positive for blood in stool and diarrhea. Negative for abdominal pain, constipation, nausea and vomiting.        PT WITH A DX OF IBD REPORTS #  4-6 LOOSE  BM'S PER DAY. reports NOTING BLOOD/MUCUS TO STOOLS.  denies C/O OF ABDOMINAL PAIN AND/OR BOUTS OF NOCTURNAL STOOLING.     Genitourinary:  Negative for dysuria, frequency and hematuria.    Musculoskeletal:  Negative for arthralgias and myalgias.   Skin:  Negative for itching, rash and wound.   Neurological:  Negative for dizziness, extremity weakness, headaches, light-headedness and numbness.   Psychiatric/Behavioral:  Negative for depression. The patient is not nervous/anxious.          ROS completed? Yes      Infusion Readiness:  - Assessment Concerns Related to Infusion: No  - Provider notified: n/a      Document Below Only If Indicated:   New Patient Education:    N/A (returning patient for continuation of therapy. Ongoing education provided as needed.)        Treatment Conditions & Drug Specific Questions:    Risankizumab  (SKYRIZI)   (Unless otherwise specified on patient specific therapy plan):     TREATMENT CONDITIONS:   Unless otherwise specified on patient specific therapy plan HOLD and notify provider prior to proceeding with treatment if:   o Positive Hepatitis B Surface Ag  o Positive T-Spot  o Signs or symptoms of hepatotoxicity  o Elevated AST / ALT  o Total Bilirubin GREATER THAN 2.2 TIMES ULN  - Positive Pregnancy    Lab Results   Component Value Date    HEPBSAG  "Nonreactive 11/10/2023      Lab Results   Component Value Date    TBSIN Negative 11/10/2023      No results found for: \"NONUHFIRE\", \"NONUHSWGH\", \"NONUHFISH\", \"EXTHEPBSAG\"    Chemistry    Lab Results   Component Value Date/Time     04/10/2024 0924    K 3.5 04/10/2024 0924     04/10/2024 0924    CO2 29 04/10/2024 0924    BUN 10 04/10/2024 0924    CREATININE 0.78 04/10/2024 0924    Lab Results   Component Value Date/Time    CALCIUM 9.2 04/10/2024 0924    ALKPHOS 64 12/14/2024 0821    AST 16 12/14/2024 0821    ALT 30 12/14/2024 0821    BILITOT 0.6 12/14/2024 0821          Lab Results   Component Value Date    ALT 30 12/14/2024    AST 16 12/14/2024    ALKPHOS 64 12/14/2024    BILITOT 0.6 12/14/2024        Labs reviewed and patient meets treatment conditions? Yes    REMINDER:  *Negative pregnancy test prior to first infusion in patients of childbearing ability.     Infusion 1: Provide patient with [employer] patient education sheet and review. Assist pt in connecting with nurse navigator if not already connected (pt to call [phone]).    Infusion 2: Assure patient is established with nurse navigator in the community. Have patient watch [employer] instructional video. (https://[email or web address]-complete/landing/crohns-resources-to-stay-on-track#obi-training-video)    Infusion 3: Complete on body injection training. Instruct patient on timing of first self injection. Typically due 4 weeks from 3rd infusion and then every 8 weeks thereafter (1st injection due 12 weeks from week 0 infusion). Refer to individual script.          - Completed? Yes. Week 2 training complete    Recommended Vitals/Observation:  Vitals: Obtain vital signs at start and end of infusion.   Observation: Patient may leave immediately after        Weight Based Drug Calculations:    WEIGHT BASED DRUGS: NOT APPLICABLE / FLAT DOSE          Initiated By: Maddie Gonzalez RN        "

## 2025-01-07 NOTE — PATIENT INSTRUCTIONS
Today :We administered risankizumab-rzaa (Skyrizi) 600 mg in dextrose 5% 260 mL IV.     For:   1. Ulcerative colitis, rectosigmoid, unspecified complication (Multi)         Your next appointment is due in:  28 DAYS        Please read the  Medication Guide that was given to you and reviewed during todays visit.     (Tell all doctors including dentists that you are taking this medication)     Go to the emergency room or call 911 if:  -You have signs of allergic reaction:   -Rash, hives, itching.   -Swollen, blistered, peeling skin.   -Swelling of face, lips, mouth, tongue or throat.   -Tightness of chest, trouble breathing, swallowing or talking     Call your doctor:  - If IV / injection site gets red, warm, swollen, itchy or leaks fluid or pus.     (Leave dressing on your IV site for at least 2 hours and keep area clean and dry  - If you get sick or have symptoms of infection or are not feeling well for any reason.    (Wash your hands often, stay away from people who are sick)  - If you have side effects from your medication that do not go away or are bothersome.     (Refer to the teaching your nurse gave you for side effects to call your doctor about)    - Common side effects may include:  stuffy nose, headache, feeling tired, muscle aches, upset stomach  - Before receiving any vaccines     - [facility] at   If:  - You get sick, are on antibiotics, have had a recent vaccine, have surgery or dental work and your doctor wants your visit rescheduled.  - You need to cancel and reschedule your visit for any reason. Call at least 2 days before your visit if you need to cancel.   - Your insurance changes before your next visit.    (We will need to get approval from your new insurance. This can take up to two weeks.)     [facility] is opened Monday thru Friday. We are closed on weekends and holidays.   Voice mail will take your call if the center is closed. If you leave a  message please allow 24 hours for a call back during weekdays. If you leave a message on a weekend/holiday, we will call you back the next business day.    A pharmacist is available Monday - Friday from 8:30AM to 3:30PM to help answer any questions you may have about your prescriptions(s). Please call pharmacy at:    [facility]: [phone]  [facility]: [phone]  [facility]: [phone]

## 2025-01-14 ENCOUNTER — APPOINTMENT (OUTPATIENT)
Dept: PRIMARY CARE | Facility: CLINIC | Age: 40
End: 2025-01-14
Payer: COMMERCIAL

## 2025-01-14 ENCOUNTER — APPOINTMENT (OUTPATIENT)
Dept: OTOLARYNGOLOGY | Facility: CLINIC | Age: 40
End: 2025-01-14
Payer: COMMERCIAL

## 2025-01-21 ENCOUNTER — SPECIALTY PHARMACY (OUTPATIENT)
Dept: PHARMACY | Facility: CLINIC | Age: 40
End: 2025-01-21

## 2025-02-04 ENCOUNTER — APPOINTMENT (OUTPATIENT)
Dept: INFUSION THERAPY | Facility: CLINIC | Age: 40
End: 2025-02-04
Payer: COMMERCIAL

## 2025-02-04 VITALS
TEMPERATURE: 97.5 F | SYSTOLIC BLOOD PRESSURE: 114 MMHG | OXYGEN SATURATION: 99 % | HEART RATE: 75 BPM | RESPIRATION RATE: 18 BRPM | DIASTOLIC BLOOD PRESSURE: 76 MMHG | WEIGHT: 165.9 LBS | BODY MASS INDEX: 25.22 KG/M2

## 2025-02-04 DIAGNOSIS — K51.319 ULCERATIVE COLITIS, RECTOSIGMOID, UNSPECIFIED COMPLICATION (MULTI): ICD-10-CM

## 2025-02-04 PROCEDURE — 96365 THER/PROPH/DIAG IV INF INIT: CPT | Performed by: NURSE PRACTITIONER

## 2025-02-04 RX ORDER — EPINEPHRINE 0.3 MG/.3ML
0.3 INJECTION SUBCUTANEOUS EVERY 5 MIN PRN
OUTPATIENT
Start: 2025-03-04

## 2025-02-04 RX ORDER — DIPHENHYDRAMINE HYDROCHLORIDE 50 MG/ML
50 INJECTION INTRAMUSCULAR; INTRAVENOUS AS NEEDED
OUTPATIENT
Start: 2025-03-04

## 2025-02-04 RX ORDER — ALBUTEROL SULFATE 0.83 MG/ML
3 SOLUTION RESPIRATORY (INHALATION) AS NEEDED
OUTPATIENT
Start: 2025-03-04

## 2025-02-04 RX ORDER — FAMOTIDINE 10 MG/ML
20 INJECTION INTRAVENOUS ONCE AS NEEDED
OUTPATIENT
Start: 2025-03-04

## 2025-02-04 ASSESSMENT — ENCOUNTER SYMPTOMS
FREQUENCY: 0
CONSTIPATION: 0
NERVOUS/ANXIOUS: 0
HEMATURIA: 0
WHEEZING: 0
DYSURIA: 0
LEG SWELLING: 0
WOUND: 0
NAUSEA: 0
NUMBNESS: 0
PALPITATIONS: 0
DIARRHEA: 0
VOMITING: 0
FATIGUE: 0
VOICE CHANGE: 0
MYALGIAS: 0
ARTHRALGIAS: 0
ABDOMINAL PAIN: 0
TROUBLE SWALLOWING: 0
DIZZINESS: 0
DEPRESSION: 0
SHORTNESS OF BREATH: 0
LIGHT-HEADEDNESS: 0
CHILLS: 0
HEADACHES: 0
EXTREMITY WEAKNESS: 0
BLOOD IN STOOL: 0
UNEXPECTED WEIGHT CHANGE: 0
COUGH: 0
EYE PROBLEMS: 0
FEVER: 0
APPETITE CHANGE: 0
SORE THROAT: 0

## 2025-02-04 NOTE — PROGRESS NOTES
[facility]   Infusion Clinic Note   Date: 2025   Name: Guilherme Ocasio  : 1985   MRN: [id number]          Reason for Visit: OP Infusion and Follow-up (PT HERE FOR SKYRIZI 600 MG INFUSION/NEXT APPT: NO NEXT APPT)         Today: We administered risankizumab-rzaa (Skyrizi) 600 mg in dextrose 5% 260 mL IV.       Visit Type: INFUSION       Ordered By: Kevin Looney MD        Accompanied by: Self       Diagnosis: Ulcerative colitis, rectosigmoid, unspecified complication (Multi)        Allergies:   Allergies as of 2025    (No Known Allergies)          Current Medications: has a current medication list which includes the following prescription(s): etrasimod and skyrizi.       Vitals:   Vitals:    25 1504 25 1617   BP: 124/78 114/76   Pulse: 85 75   Resp: 18 18   Temp: 36 °C (96.8 °F) 36.4 °C (97.5 °F)   TempSrc: Temporal Temporal   SpO2: 97% 99%   Weight: 75.3 kg (165 lb 14.3 oz)              Infusion Pre-procedure Checklist:   - Allergies & Medications reviewed: yes       - Previous reaction to current treatment: no      Assess patient for the concerns below. Document provider notification as appropriate.  - Active or recent infection with/without current antibiotic use: no  - Recent or planned invasive dental work: no  - Recent or planned surgeries: no  - Recently received or plans to receive vaccinations: no  - Has treatment related toxicities: no  - Any chance you may be pregnant:  n/a      Pain: 0   - Is the pain different from normal: n/a   - Is your Doctor aware:  n/a       Labs: N/A          Fall Risk Screening: Schrader Fall Risk  History of Falling, Immediate or Within 3 Months: No  Secondary Diagnosis: No  Ambulatory Aid: Walks without aid/bedrest/nurse assist  Intravenous Therapy/Heparin Lock: Yes  Gait/Transferring: Normal/bedrest/immobile  Mental Status: Oriented to own ability  Schrader Fall Risk Score: 20       Review Of Systems:  Review of Systems  "  Constitutional:  Negative for appetite change, chills, fatigue, fever and unexpected weight change.   HENT:   Negative for hearing loss, mouth sores, sore throat, tinnitus, trouble swallowing and voice change.    Eyes:  Negative for eye problems.   Respiratory:  Negative for cough, shortness of breath and wheezing.    Cardiovascular:  Negative for chest pain, leg swelling and palpitations.   Gastrointestinal:  Negative for abdominal pain, blood in stool, constipation, diarrhea, nausea and vomiting.   Genitourinary:  Negative for dysuria, frequency and hematuria.    Musculoskeletal:  Negative for arthralgias and myalgias.   Skin:  Negative for itching, rash and wound.   Neurological:  Negative for dizziness, extremity weakness, headaches, light-headedness and numbness.   Psychiatric/Behavioral:  Negative for depression. The patient is not nervous/anxious.          ROS completed? Yes      Infusion Readiness:  - Assessment Concerns Related to Infusion: No  - Provider notified: n/a      Document Below Only If Indicated:   New Patient Education:    N/A (returning patient for continuation of therapy. Ongoing education provided as needed.)        Treatment Conditions & Drug Specific Questions:    Risankizumab  (SKYRIZI)   (Unless otherwise specified on patient specific therapy plan):     TREATMENT CONDITIONS:   Unless otherwise specified on patient specific therapy plan HOLD and notify provider prior to proceeding with treatment if:   o Positive Hepatitis B Surface Ag  o Positive T-Spot  o Signs or symptoms of hepatotoxicity  o Elevated AST / ALT  o Total Bilirubin GREATER THAN 2.2 TIMES ULN  - Positive Pregnancy    Lab Results   Component Value Date    HEPBSAG Nonreactive 11/10/2023      Lab Results   Component Value Date    TBSIN Negative 11/10/2023      No results found for: \"NONUHFIRE\", \"NONUHSWGH\", \"NONUHFISH\", \"EXTHEPBSAG\"    Chemistry    Lab Results   Component Value Date/Time     04/10/2024 0924    K 3.5 " 04/10/2024 0924     04/10/2024 0924    CO2 29 04/10/2024 0924    BUN 10 04/10/2024 0924    CREATININE 0.78 04/10/2024 0924    Lab Results   Component Value Date/Time    CALCIUM 9.2 04/10/2024 0924    ALKPHOS 64 12/14/2024 0821    AST 16 12/14/2024 0821    ALT 30 12/14/2024 0821    BILITOT 0.6 12/14/2024 0821          Lab Results   Component Value Date    ALT 30 12/14/2024    AST 16 12/14/2024    ALKPHOS 64 12/14/2024    BILITOT 0.6 12/14/2024        Labs reviewed and patient meets treatment conditions? Yes    REMINDER:  *Negative pregnancy test prior to first infusion in patients of childbearing ability.     Infusion 1: Provide patient with [employer] patient education sheet and review. Assist pt in connecting with nurse navigator if not already connected (pt to call [phone]).    Infusion 2: Assure patient is established with nurse navigator in the community. Have patient watch "[employer] instructional video. (https://[email or web address]-[email or web address]/[email or web address]/crohns-resources-to-stay-on-track#obi-training-video)    Infusion 3: Complete on body injection training. Instruct patient on timing of first self injection. Typically due 4 weeks from 3rd infusion and then every 8 weeks thereafter (1st injection due 12 weeks from week 0 infusion). Refer to individual script.          - Completed? Yes. Week 3 training complete     Recommended Vitals/Observation:  Vitals: Obtain vital signs at start and end of infusion.   Observation: Patient may leave immediately after        Weight Based Drug Calculations:    WEIGHT BASED DRUGS: NOT APPLICABLE / FLAT DOSE          Initiated By: Maddie Gonzalez RN

## 2025-02-04 NOTE — PATIENT INSTRUCTIONS
Today :We administered risankizumab-rzaa (Skyrizi) 600 mg in dextrose 5% 260 mL IV.     For:   1. Ulcerative colitis, rectosigmoid, unspecified complication (Multi)         Your next appointment is due in:  NO NEXT APPT        Please read the  Medication Guide that was given to you and reviewed during todays visit.     (Tell all doctors including dentists that you are taking this medication)     Go to the emergency room or call 911 if:  -You have signs of allergic reaction:   -Rash, hives, itching.   -Swollen, blistered, peeling skin.   -Swelling of face, lips, mouth, tongue or throat.   -Tightness of chest, trouble breathing, swallowing or talking     Call your doctor:  - If IV / injection site gets red, warm, swollen, itchy or leaks fluid or pus.     (Leave dressing on your IV site for at least 2 hours and keep area clean and dry  - If you get sick or have symptoms of infection or are not feeling well for any reason.    (Wash your hands often, stay away from people who are sick)  - If you have side effects from your medication that do not go away or are bothersome.     (Refer to the teaching your nurse gave you for side effects to call your doctor about)    - Common side effects may include:  stuffy nose, headache, feeling tired, muscle aches, upset stomach  - Before receiving any vaccines     - Call [facility] at   If:  - You get sick, are on antibiotics, have had a recent vaccine, have surgery or dental work and your doctor wants your visit rescheduled.  - You need to cancel and reschedule your visit for any reason. Call at least 2 days before your visit if you need to cancel.   - Your insurance changes before your next visit.    (We will need to get approval from your new insurance. This can take up to two weeks.)     [facility] is opened Monday thru Friday. We are closed on weekends and holidays.   Voice mail will take your call if the center is closed. If you leave a  message please allow 24 hours for a call back during weekdays. If you leave a message on a weekend/holiday, we will call you back the next business day.    A pharmacist is available Monday - Friday from 8:30AM to 3:30PM to help answer any questions you may have about your prescriptions(s). Please call pharmacy at:    [facility]: [phone]  [facility]: [phone]  [facility]: [phone]

## 2025-02-05 ENCOUNTER — SPECIALTY PHARMACY (OUTPATIENT)
Dept: PHARMACY | Facility: CLINIC | Age: 40
End: 2025-02-05

## 2025-02-05 DIAGNOSIS — K51.319 ULCERATIVE COLITIS, RECTOSIGMOID, UNSPECIFIED COMPLICATION (MULTI): Primary | ICD-10-CM

## 2025-02-13 PROCEDURE — RXMED WILLOW AMBULATORY MEDICATION CHARGE

## 2025-02-17 NOTE — PROGRESS NOTES
REASON FOR VISIT:  ulcerative colitis    HPI:  Guilherme Ocasio is a 39 y.o. male who presents for follow-up.  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       -h/o bicuspid aortic valve  -nephrolithiasis     Difficult to control disease.  He has tried and failed Humira, Remicade, Entyvio, tofacitinib, upadacitinib, etrasimod, and topical steroids.  Intermittently requires oral steroid rescue.    11/2024 F/S  showed persistent proctosigmoiditis with Gale 3 disease in the distal 3-5 cm of the rectum with ulceration and otherwise Gale 2 disease in rectum and sigmoid.  Descending colon remained normal endoscopically.  Path c/w UC; no CMV.  Overall, was feeling worse recently off of prednisone.  Stools up to 8-10 daily.  FCP > 3000.  Also with some joint discomfort and some oral aphthous ulcers on tongue and gums.  No nocturnal stools.  (+) urgency (+) blood about 2-3 times per week.  Was tolerating etrasimod without difficulty.  Gave him prednisone again 40 mg x 14 days then taper 5 mg per week.  Continue etrasimod (gets via patient program for free) and add rizankizumab.    Received risankizumab, first dose 12/10/24.  Last seen 12/17/24 and Prednisone at 25 mg daily.  No longer taking HC suppositories; on etrasimod.  Felt well.  Frequency 3-4 daily.  No blood in stool; stopped a couple of days after he started prednisone.  Still with urgency but better, had 2-3 minutes before needs to get to BR.  Stool formed. No nocturnal stools.  No abd pain.  Energy good.  No real side effects at prednisone higher doses.  Eating well; weight stable.  Plan was to complete Skyrizi induction and taper prednisone 5 mg per week.    Completed risankizumab induction 2/4/25.  In follow-up today, has 4 days left of prednisone 5 mg.  Continues on etrasimod 2 mg daily.  He had to interrupt etrasimod twice due to rhinosinusitis; needed a course of amoxicillin.  Stools are 4-5 daily.  When he switched to 5 mg prednisone,  stools increased from 2-3 to 4-5 daily.  Seeing some blood in the stool around once daily or every other day, small amount.  Joints are good.  Sleeping through the night.  About 1 week after the Skyrizi he feels fatigued, otherwsie tolerating it fine.      Overall feels pretty well.  Energy good.  Stools not interrupting work.  Most stools between 7AM and 12 noon.  Urgency is better.  No extra trips to bathroom.  With decrease from 10 to 5 mg of prednisone, stools have changed from formed to flat and smaller blobs.      No IBD EIMs  REVIEW OF SYSTEMS      No Known Allergies    Past Medical History:   Diagnosis Date    Arthritis     Bicuspid aortic valve     Disease of thyroid gland     Kidney stones     Ulcerative colitis        Past Surgical History:   Procedure Laterality Date    COLONOSCOPY      OTHER SURGICAL HISTORY  07/27/2016    Cystoscopy With Insertion Of Ureteral Stent Right    OTHER SURGICAL HISTORY  07/27/2016    Lithotripsy       Current Outpatient Medications   Medication Sig Dispense Refill    risankizumab-rzaa (Skyrizi) 360 mg/2.4 mL (150 mg/mL) wearable injector injection Infuse contents of one onbody injector (360 mg) under the skin every 8 (eight) weeks. 2.4 mL 2     No current facility-administered medications for this visit.       PHYSICAL EXAM:  /79   Pulse 81   Temp 36 °C (96.8 °F)   Wt 75.8 kg (167 lb)   SpO2 97% Comment: RA  BMI 25.39 kg/m²   Vital signs reviewed  Patient alert and oriented in no acute distress  Anicteric  No cervical adenopathy  Cardiac exam regular rate and rhythm S1-S2 without murmurs gallops or rubs  Lungs clear to auscultation bilaterally  Abdomen soft and nontender without organomegaly or mass.  No rebound or guarding.  Bowel sounds present      Lab Results   Component Value Date    WBC 3.4 (L) 12/14/2024    HGB 13.1 (L) 12/14/2024    HCT 40.0 (L) 12/14/2024    MCV 83 12/14/2024     12/14/2024     Lab Results   Component Value Date    ALT 30  12/14/2024    AST 16 12/14/2024    ALKPHOS 64 12/14/2024    BILITOT 0.6 12/14/2024       === 04/10/24 ===    CT ANGIO CHEST FOR PULMONARY EMBOLISM    - Impression -  No evidence of pulmonary embolism.  Right lower lobe pneumonia.    MACRO:  None    Signed by: Evans Tan 4/10/2024 1:56 PM  Dictation workstation:   [id number]      ASSESSMENT  #UC-generally, seems to be doing better on risankizumab in combination with low-dose prednisone and Etrasimod 2 mg daily.  However, as he is tapered the prednisone from 10 mg daily to 5 mg daily seen some increase in symptoms in the return of small amounts of blood in the stool.  We will slow down the prednisone taper until he gets on maintenance risankizumab therapy in early March.  He will stay on 5 mg daily until then and then taper by 1 mg/month until off prednisone.  We will see him back in 3 months with labs and calprotectin prior to follow-up.  He will call with any worsened symptoms    #Joint discomfort-resolved on therapy    PLAN  1) continue prednisone 5 mg daily until you take your first Skyrizi on body injector for maintenance therapy  2) then, taper prednisone by 1 mg every 2 weeks until off prednisone  3) continue Velsipity once daily  4) follow-up in the office in 3 months  5) about 1 week prior to follow-up, please complete stool fecal calprotectin and labs as ordered  6) contact the office if symptoms worsen before scheduled follow-up

## 2025-02-18 ENCOUNTER — PHARMACY VISIT (OUTPATIENT)
Dept: PHARMACY | Facility: CLINIC | Age: 40
End: 2025-02-18
Payer: COMMERCIAL

## 2025-02-18 ENCOUNTER — OFFICE VISIT (OUTPATIENT)
Dept: GASTROENTEROLOGY | Facility: HOSPITAL | Age: 40
End: 2025-02-18
Payer: COMMERCIAL

## 2025-02-18 VITALS
DIASTOLIC BLOOD PRESSURE: 79 MMHG | BODY MASS INDEX: 25.39 KG/M2 | OXYGEN SATURATION: 97 % | WEIGHT: 167 LBS | HEART RATE: 81 BPM | TEMPERATURE: 96.8 F | SYSTOLIC BLOOD PRESSURE: 120 MMHG

## 2025-02-18 DIAGNOSIS — K51.211 ULCERATIVE PROCTITIS WITH RECTAL BLEEDING (MULTI): Primary | ICD-10-CM

## 2025-02-18 PROCEDURE — 1036F TOBACCO NON-USER: CPT | Performed by: INTERNAL MEDICINE

## 2025-02-18 PROCEDURE — 99213 OFFICE O/P EST LOW 20 MIN: CPT | Performed by: INTERNAL MEDICINE

## 2025-02-18 ASSESSMENT — ENCOUNTER SYMPTOMS
DEPRESSION: 0
LOSS OF SENSATION IN FEET: 0
OCCASIONAL FEELINGS OF UNSTEADINESS: 0

## 2025-02-18 ASSESSMENT — PAIN SCALES - GENERAL: PAINLEVEL_OUTOF10: 0-NO PAIN

## 2025-02-18 NOTE — PATIENT INSTRUCTIONS
Thank you for coming in for follow-up today.  Overall, it seems like things are improving on Skyrizi therapy.  As we discussed today:    PLAN  1) continue prednisone 5 mg daily until you take your first Skyrizi on body injector for maintenance therapy  2) then, taper prednisone by 1 mg every 2 weeks until off prednisone  3) continue Velsipity once daily  4) follow-up in the office in 3 months  5) about 1 week prior to follow-up, please complete stool fecal calprotectin and labs as ordered  6) contact the office if symptoms worsen before scheduled follow-up

## 2025-02-19 ENCOUNTER — SPECIALTY PHARMACY (OUTPATIENT)
Dept: PHARMACY | Facility: CLINIC | Age: 40
End: 2025-02-19

## 2025-02-19 NOTE — PROGRESS NOTES
[facility] Clinical Note  Initial Patient Education     Introduction  Guilherme Ocasio is a 39 y.o. male who is on the specialty pharmacy service for management of: Gastroenterology Core.    Guilherme Ocasio is initiating the following therapy: Skyrizi 360 mg once every 8 weeks    Medication receipt date: 2/19/2025  Duration of therapy: Maintenance    The most recent encounter visit with the referring prescriber Dr. Looney on 2/18/2025 was reviewed.  Pharmacy will continue to collaborate in the care of this patient with the referring prescriber.    Clinical Background  An initial assessment was conducted prior to first fill of the medication to determine the appropriateness of therapy given the patient's diagnosis, medication list, comorbidities, allergies, medical history, patient's ability to self administer medication, and therapeutic goals based on possible outcomes of therapy. Refer to initial assessment task completed on 2/12/2025.    Labs/Procedures for clinical appropriateness that were reviewed include:   Gastroenterology - TB:   Lab Results   Component Value Date    TBSIN Negative 11/10/2023    and LFTs and bilirubin:   Lab Results   Component Value Date    ALT 30 12/14/2024    AST 16 12/14/2024    ALKPHOS 64 12/14/2024    BILITOT 0.6 12/14/2024       Education/Discussion  Guilherme was contacted on 2/19/2025 at 5:51 PM for a pharmacy visit with encounter number [phone] from:   [facility]  [address] [place] [id number]  Dept: [phone]  Dept Fax: [phone]  Guilherme consented to a/an Telephone visit, which was performed.    Medication Start Date (planned or actual): 3/4/2025  Education was conducted prior to start of therapy? Yes    Education discussed includes the following:  Patient Education  Counseled the Patient on the Following : Doses and administration, Adherence and missed doses, Possible side effects and management, Lab  monitoring and follow-up, Safe handling, storage, and disposal, Pharmacy contact information  Learner: Patient  Education Method: Explanation  Education Response: Verbalizes understanding  Additional details of the medication specific counseling are found within the linked patient education flowsheet.     The follow up timeline was discussed. Every person responds to and reacts to therapy differently. Patient should be assessed for efficacy and tolerability in approximately: 3 months    Provided education on goals and possible outcomes of therapy:  Adherence with therapy  Timely completion of appropriate labs  Timely and appropriate follow up with provider  Identify and address medication interactions with presciption medications, OTC medications and supplements  Optimize or maintain quality of life  Gastroenterology: Improve gastrointestinal clinical symptoms such as abdominal pain, inflammation, diarrhea, constipation, and bleeding, Reduce frequency and urgency of bowel movements, and Allow for GI mucosal healing (observed through endoscopy and colonoscopy)     The importance of adherence was discussed and they were advised to take the medication as prescribed by their provider.     Impression/Plan  Review and Assessment   Reviewed During This Encounter: Medications  Medications Assessed for Appropriate Use, Dose, Route, Frequency, and Duration: Yes  Medication Reconciliation Completed: No (Comment) (Skyrizi only medication)  Drug Interactions Evaluated: No (Comment) (Skyrizi only medication)  Clinically Relevant Drug Interactions Identified: No    This patient has not been identified as high risk due to Lack of high risk qualifiers.  The following action was taken: N/A.         The  Specialty Pharmacy Welcome packet may be viewed here:   Specialty Pharmacy Welcome Packet     Or by scanning QR code:      Provided contact information ([phone]) for [facility] and reviewed  dispensing process, refill timeline and patient management follow up. Advised to contact the pharmacy if there are any adverse effects and/or changes to medication list, including prescriptions, OTC medications, herbal products, or supplements. Confirmed understanding of education conducted during assessment. All questions and concerns were addressed and patient was encouraged to reach out for additional questions or concerns.      Kristine Carmona, PharmD

## 2025-03-04 ENCOUNTER — APPOINTMENT (OUTPATIENT)
Dept: PRIMARY CARE | Facility: CLINIC | Age: 40
End: 2025-03-04
Payer: COMMERCIAL

## 2025-03-04 DIAGNOSIS — K51.211 ULCERATIVE PROCTITIS WITH RECTAL BLEEDING (MULTI): Primary | ICD-10-CM

## 2025-03-04 RX ORDER — PREDNISONE 1 MG/1
TABLET ORAL
Qty: 120 TABLET | Refills: 3 | Status: SHIPPED | OUTPATIENT
Start: 2025-03-04

## 2025-03-05 PROCEDURE — RXMED WILLOW AMBULATORY MEDICATION CHARGE

## 2025-03-07 ENCOUNTER — PHARMACY VISIT (OUTPATIENT)
Dept: PHARMACY | Facility: CLINIC | Age: 40
End: 2025-03-07
Payer: COMMERCIAL

## 2025-03-24 ENCOUNTER — APPOINTMENT (OUTPATIENT)
Dept: RHEUMATOLOGY | Facility: CLINIC | Age: 40
End: 2025-03-24
Payer: COMMERCIAL

## 2025-04-14 ENCOUNTER — SPECIALTY PHARMACY (OUTPATIENT)
Dept: PHARMACY | Facility: CLINIC | Age: 40
End: 2025-04-14

## 2025-04-14 PROCEDURE — RXMED WILLOW AMBULATORY MEDICATION CHARGE

## 2025-04-15 ENCOUNTER — PHARMACY VISIT (OUTPATIENT)
Dept: PHARMACY | Facility: CLINIC | Age: 40
End: 2025-04-15
Payer: COMMERCIAL

## 2025-04-23 PROCEDURE — RXMED WILLOW AMBULATORY MEDICATION CHARGE

## 2025-04-29 ENCOUNTER — PHARMACY VISIT (OUTPATIENT)
Dept: PHARMACY | Facility: CLINIC | Age: 40
End: 2025-04-29
Payer: COMMERCIAL

## 2025-05-05 ASSESSMENT — PATIENT GLOBAL ASSESSMENT (PGA): WHAT IS THE PGA: PATIENT GLOBAL ASSESSMENT:  1 - CLEAR

## 2025-05-05 ASSESSMENT — DERMATOLOGY QUALITY OF LIFE (QOL) ASSESSMENT
RATE HOW EMOTIONALLY BOTHERED YOU ARE BY YOUR SKIN PROBLEM (FOR EXAMPLE, WORRY, EMBARRASSMENT, FRUSTRATION): 0 - NEVER BOTHERED
RATE HOW BOTHERED YOU ARE BY SYMPTOMS OF YOUR SKIN PROBLEM (EG, ITCHING, STINGING BURNING, HURTING OR SKIN IRRITATION): 0 - NEVER BOTHERED
RATE HOW BOTHERED YOU ARE BY EFFECTS OF YOUR SKIN PROBLEMS ON YOUR ACTIVITIES (EG, GOING OUT, ACCOMPLISHING WHAT YOU WANT, WORK ACTIVITIES OR YOUR RELATIONSHIPS WITH OTHERS): 0 - NEVER BOTHERED
WHAT SINGLE SKIN CONDITION LISTED BELOW IS THE PATIENT ANSWERING THE QUALITY-OF-LIFE ASSESSMENT QUESTIONS ABOUT: NONE OF THE ABOVE
RATE HOW EMOTIONALLY BOTHERED YOU ARE BY YOUR SKIN PROBLEM (FOR EXAMPLE, WORRY, EMBARRASSMENT, FRUSTRATION): 0 - NEVER BOTHERED
RATE HOW BOTHERED YOU ARE BY SYMPTOMS OF YOUR SKIN PROBLEM (EG, ITCHING, STINGING BURNING, HURTING OR SKIN IRRITATION): 0 - NEVER BOTHERED
WHAT SINGLE SKIN CONDITION LISTED BELOW IS THE PATIENT ANSWERING THE QUALITY-OF-LIFE ASSESSMENT QUESTIONS ABOUT: NONE OF THE ABOVE
RATE HOW BOTHERED YOU ARE BY EFFECTS OF YOUR SKIN PROBLEMS ON YOUR ACTIVITIES (EG, GOING OUT, ACCOMPLISHING WHAT YOU WANT, WORK ACTIVITIES OR YOUR RELATIONSHIPS WITH OTHERS): 0 - NEVER BOTHERED

## 2025-05-07 ENCOUNTER — APPOINTMENT (OUTPATIENT)
Dept: DERMATOLOGY | Facility: CLINIC | Age: 40
End: 2025-05-07
Payer: COMMERCIAL

## 2025-05-07 DIAGNOSIS — D23.30 FIBROUS PAPULE OF FACE: ICD-10-CM

## 2025-05-07 DIAGNOSIS — Z92.25 HISTORY OF IMMUNOSUPPRESSION: ICD-10-CM

## 2025-05-07 DIAGNOSIS — D22.9 MULTIPLE BENIGN NEVI: Primary | ICD-10-CM

## 2025-05-07 PROCEDURE — 99203 OFFICE O/P NEW LOW 30 MIN: CPT | Performed by: STUDENT IN AN ORGANIZED HEALTH CARE EDUCATION/TRAINING PROGRAM

## 2025-05-07 ASSESSMENT — ITCH NUMERIC RATING SCALE: HOW SEVERE IS YOUR ITCHING?: 0

## 2025-05-07 ASSESSMENT — DERMATOLOGY PATIENT ASSESSMENT
HAVE YOU HAD OR DO YOU HAVE A STAPH INFECTION: NO
DO YOU HAVE ANY NEW OR CHANGING LESIONS: NO
HAVE YOU HAD OR DO YOU HAVE VASCULAR DISEASE: NO
ARE YOU AN ORGAN TRANSPLANT RECIPIENT: NO
DO YOU USE A TANNING BED: NO

## 2025-05-07 ASSESSMENT — DERMATOLOGY QUALITY OF LIFE (QOL) ASSESSMENT
ARE THERE EXCLUSIONS OR EXCEPTIONS FOR THE QUALITY OF LIFE ASSESSMENT: NO
RATE HOW EMOTIONALLY BOTHERED YOU ARE BY YOUR SKIN PROBLEM (FOR EXAMPLE, WORRY, EMBARRASSMENT, FRUSTRATION): 0 - NEVER BOTHERED
DATE THE QUALITY-OF-LIFE ASSESSMENT WAS COMPLETED: 67332
RATE HOW BOTHERED YOU ARE BY SYMPTOMS OF YOUR SKIN PROBLEM (EG, ITCHING, STINGING BURNING, HURTING OR SKIN IRRITATION): 0 - NEVER BOTHERED
RATE HOW BOTHERED YOU ARE BY EFFECTS OF YOUR SKIN PROBLEMS ON YOUR ACTIVITIES (EG, GOING OUT, ACCOMPLISHING WHAT YOU WANT, WORK ACTIVITIES OR YOUR RELATIONSHIPS WITH OTHERS): 0 - NEVER BOTHERED

## 2025-05-07 ASSESSMENT — PATIENT GLOBAL ASSESSMENT (PGA): PATIENT GLOBAL ASSESSMENT: PATIENT GLOBAL ASSESSMENT:  1 - CLEAR

## 2025-05-07 NOTE — Clinical Note
Medication already sent to patient pharmacy.    Recommend skin checks every 2-3 years for now given excellent baseline skin exam  Over the counter use of sun screen recommended

## 2025-05-07 NOTE — PROGRESS NOTES
Subjective     Guilherme Ocasio is a 39 y.o. male who presents for the following: Skin Check (No areas of concern. Pt reports he is immunocompromised.).     Review of Systems:  No other skin or systemic complaints other than what is documented elsewhere in the note.    The following portions of the chart were reviewed this encounter and updated as appropriate:          Skin Cancer History  Biopsy Log Book  No skin cancers from Specimen Tracking.    Additional History      Specialty Problems          Dermatology Problems    Hemangioma of skin and subcutaneous tissue    Melanocytic nevi of scalp and neck    Other hypertrophic disorders of the skin        Objective   Well appearing patient in no apparent distress; mood and affect are within normal limits.    A focused skin examination was performed. All findings within normal limits unless otherwise noted below.    Recommend skin checks every 2-3 years for now given excellent baseline skin exam  Over the counter use of sun screen recommended  Nose  Skin colored soft papules       Assessment/Plan   MULTIPLE BENIGN NEVI  Generalized  Exam findings: Benign appearing macules and papules  Plan: monitor for any new or changing nevi. Notify me should this occur.  Over the counter use of sun screen product (30+ SPF with mineral sun screen) recommended    FIBROUS PAPULE OF FACE  Nose  HISTORY OF IMMUNOSUPPRESSION  Generalized  Recommend skin checks every 2-3 years for now given excellent baseline skin exam  Over the counter use of sun screen recommended

## 2025-05-12 ENCOUNTER — APPOINTMENT (OUTPATIENT)
Dept: RHEUMATOLOGY | Facility: CLINIC | Age: 40
End: 2025-05-12
Payer: COMMERCIAL

## 2025-05-14 LAB
T3FREE SERPL-MCNC: 3.8 PG/ML (ref 2.3–4.2)
T4 FREE SERPL-MCNC: 1.3 NG/DL (ref 0.8–1.8)
TSH SERPL-ACNC: 0.41 MIU/L (ref 0.4–4.5)

## 2025-05-16 LAB
ALBUMIN SERPL-MCNC: 4.6 G/DL (ref 3.6–5.1)
ALBUMIN/GLOB SERPL: 1.7 (CALC) (ref 1–2.5)
ALP SERPL-CCNC: 80 U/L (ref 36–130)
ALT SERPL-CCNC: 29 U/L (ref 9–46)
AST SERPL-CCNC: 17 U/L (ref 10–40)
BASOPHILS # BLD AUTO: 22 CELLS/UL (ref 0–200)
BASOPHILS NFR BLD AUTO: 0.6 %
BILIRUB DIRECT SERPL-MCNC: 0.1 MG/DL
BILIRUB INDIRECT SERPL-MCNC: 0.5 MG/DL (CALC) (ref 0.2–1.2)
BILIRUB SERPL-MCNC: 0.6 MG/DL (ref 0.2–1.2)
CALPROTECTIN STL-MCNT: NORMAL UG/G
EOSINOPHIL # BLD AUTO: 187 CELLS/UL (ref 15–500)
EOSINOPHIL NFR BLD AUTO: 5.2 %
ERYTHROCYTE [DISTWIDTH] IN BLOOD BY AUTOMATED COUNT: 12.9 % (ref 11–15)
GLOBULIN SER CALC-MCNC: 2.7 G/DL (CALC) (ref 1.9–3.7)
HCT VFR BLD AUTO: 42.3 % (ref 38.5–50)
HGB BLD-MCNC: 14.2 G/DL (ref 13.2–17.1)
IGNF NEG CNTRL BLD: NORMAL
LYMPHOCYTES # BLD AUTO: 356 CELLS/UL (ref 850–3900)
LYMPHOCYTES NFR BLD AUTO: 9.9 %
M TB IFN-G BLD-IMP: NEGATIVE
MCH RBC QN AUTO: 27.9 PG (ref 27–33)
MCHC RBC AUTO-ENTMCNC: 33.6 G/DL (ref 32–36)
MCV RBC AUTO: 83.1 FL (ref 80–100)
MITOGEN IGNF.SPOT COUNT BLD: NORMAL
MONOCYTES # BLD AUTO: 518 CELLS/UL (ref 200–950)
MONOCYTES NFR BLD AUTO: 14.4 %
NEUTROPHILS # BLD AUTO: 2516 CELLS/UL (ref 1500–7800)
NEUTROPHILS NFR BLD AUTO: 69.9 %
PLATELET # BLD AUTO: 237 THOUSAND/UL (ref 140–400)
PMV BLD REES-ECKER: 10.2 FL (ref 7.5–12.5)
PROT SERPL-MCNC: 7.3 G/DL (ref 6.1–8.1)
QUEST PANEL A SPOT COUNT: 2
QUEST PANEL B SPOT COUNT: 1
RBC # BLD AUTO: 5.09 MILLION/UL (ref 4.2–5.8)
WBC # BLD AUTO: 3.6 THOUSAND/UL (ref 3.8–10.8)

## 2025-05-17 ENCOUNTER — CLINICAL SUPPORT (OUTPATIENT)
Dept: EMERGENCY MEDICINE | Facility: HOSPITAL | Age: 40
End: 2025-05-17
Payer: COMMERCIAL

## 2025-05-17 ENCOUNTER — HOSPITAL ENCOUNTER (EMERGENCY)
Facility: HOSPITAL | Age: 40
Discharge: HOME | End: 2025-05-17
Attending: EMERGENCY MEDICINE
Payer: COMMERCIAL

## 2025-05-17 VITALS
BODY MASS INDEX: 25.31 KG/M2 | DIASTOLIC BLOOD PRESSURE: 72 MMHG | OXYGEN SATURATION: 97 % | WEIGHT: 167 LBS | HEART RATE: 73 BPM | HEIGHT: 68 IN | TEMPERATURE: 97.1 F | RESPIRATION RATE: 16 BRPM | SYSTOLIC BLOOD PRESSURE: 116 MMHG

## 2025-05-17 DIAGNOSIS — R42 DIZZINESS: Primary | ICD-10-CM

## 2025-05-17 LAB
ALBUMIN SERPL BCP-MCNC: 4.5 G/DL (ref 3.4–5)
ALP SERPL-CCNC: 86 U/L (ref 33–120)
ALT SERPL W P-5'-P-CCNC: 34 U/L (ref 10–52)
ANION GAP SERPL CALC-SCNC: 13 MMOL/L (ref 10–20)
AST SERPL W P-5'-P-CCNC: 19 U/L (ref 9–39)
ATRIAL RATE: 69 BPM
BASOPHILS # BLD AUTO: 0.02 X10*3/UL (ref 0–0.1)
BASOPHILS NFR BLD AUTO: 0.3 %
BILIRUB SERPL-MCNC: 0.7 MG/DL (ref 0–1.2)
BUN SERPL-MCNC: 16 MG/DL (ref 6–23)
CALCIUM SERPL-MCNC: 9.3 MG/DL (ref 8.6–10.6)
CHLORIDE SERPL-SCNC: 105 MMOL/L (ref 98–107)
CO2 SERPL-SCNC: 25 MMOL/L (ref 21–32)
CREAT SERPL-MCNC: 0.64 MG/DL (ref 0.5–1.3)
EGFRCR SERPLBLD CKD-EPI 2021: >90 ML/MIN/1.73M*2
EOSINOPHIL # BLD AUTO: 0.02 X10*3/UL (ref 0–0.7)
EOSINOPHIL NFR BLD AUTO: 0.3 %
ERYTHROCYTE [DISTWIDTH] IN BLOOD BY AUTOMATED COUNT: 12.1 % (ref 11.5–14.5)
GLUCOSE SERPL-MCNC: 89 MG/DL (ref 74–99)
HCT VFR BLD AUTO: 41.6 % (ref 41–52)
HGB BLD-MCNC: 14.9 G/DL (ref 13.5–17.5)
IMM GRANULOCYTES # BLD AUTO: 0.02 X10*3/UL (ref 0–0.7)
IMM GRANULOCYTES NFR BLD AUTO: 0.3 % (ref 0–0.9)
LYMPHOCYTES # BLD AUTO: 0.29 X10*3/UL (ref 1.2–4.8)
LYMPHOCYTES NFR BLD AUTO: 4.8 %
MCH RBC QN AUTO: 28.1 PG (ref 26–34)
MCHC RBC AUTO-ENTMCNC: 35.8 G/DL (ref 32–36)
MCV RBC AUTO: 79 FL (ref 80–100)
MONOCYTES # BLD AUTO: 0.35 X10*3/UL (ref 0.1–1)
MONOCYTES NFR BLD AUTO: 5.9 %
NEUTROPHILS # BLD AUTO: 5.28 X10*3/UL (ref 1.2–7.7)
NEUTROPHILS NFR BLD AUTO: 88.4 %
NRBC BLD-RTO: 0 /100 WBCS (ref 0–0)
P AXIS: 50 DEGREES
P OFFSET: 200 MS
P ONSET: 142 MS
PLATELET # BLD AUTO: 228 X10*3/UL (ref 150–450)
POTASSIUM SERPL-SCNC: 3.9 MMOL/L (ref 3.5–5.3)
PR INTERVAL: 148 MS
PROT SERPL-MCNC: 7.6 G/DL (ref 6.4–8.2)
Q ONSET: 216 MS
QRS COUNT: 12 BEATS
QRS DURATION: 120 MS
QT INTERVAL: 422 MS
QTC CALCULATION(BAZETT): 452 MS
QTC FREDERICIA: 442 MS
R AXIS: 20 DEGREES
RBC # BLD AUTO: 5.3 X10*6/UL (ref 4.5–5.9)
SODIUM SERPL-SCNC: 139 MMOL/L (ref 136–145)
T AXIS: 26 DEGREES
T OFFSET: 427 MS
VENTRICULAR RATE: 69 BPM
WBC # BLD AUTO: 6 X10*3/UL (ref 4.4–11.3)

## 2025-05-17 PROCEDURE — 93010 ELECTROCARDIOGRAM REPORT: CPT | Performed by: EMERGENCY MEDICINE

## 2025-05-17 PROCEDURE — 36415 COLL VENOUS BLD VENIPUNCTURE: CPT

## 2025-05-17 PROCEDURE — 80053 COMPREHEN METABOLIC PANEL: CPT

## 2025-05-17 PROCEDURE — 99284 EMERGENCY DEPT VISIT MOD MDM: CPT | Performed by: EMERGENCY MEDICINE

## 2025-05-17 PROCEDURE — 85025 COMPLETE CBC W/AUTO DIFF WBC: CPT

## 2025-05-17 PROCEDURE — 93005 ELECTROCARDIOGRAM TRACING: CPT

## 2025-05-17 ASSESSMENT — LIFESTYLE VARIABLES
HAVE YOU EVER FELT YOU SHOULD CUT DOWN ON YOUR DRINKING: NO
EVER HAD A DRINK FIRST THING IN THE MORNING TO STEADY YOUR NERVES TO GET RID OF A HANGOVER: NO
HAVE PEOPLE ANNOYED YOU BY CRITICIZING YOUR DRINKING: NO
EVER FELT BAD OR GUILTY ABOUT YOUR DRINKING: NO
TOTAL SCORE: 0

## 2025-05-17 ASSESSMENT — PAIN - FUNCTIONAL ASSESSMENT: PAIN_FUNCTIONAL_ASSESSMENT: 0-10

## 2025-05-17 ASSESSMENT — PAIN SCALES - GENERAL: PAINLEVEL_OUTOF10: 0 - NO PAIN

## 2025-05-17 NOTE — ED PROVIDER NOTES
History of Present Illness     History provided by: Patient and Significant Other  Limitations to History: None  External Records Reviewed with Brief Summary: None    HPI:  Guilherme Ocasio is a 39 y.o. male with medical history notable for bicuspid aortic valve, ulcerative colitis requiring 2 immunosuppressives.  He is coming today with dizziness which he initially noted upon waking this morning.  Became worse with some activity, he symptoms were also associated with nausea and vomiting which did improve after Zofran, this did not improve his vertigo.  He awoke around 8 AM this morning and has been having continued symptoms.  Starting to resolve, approximately 90% improved at this point.  He has not been otherwise ill, denies CP/SOB, palpitations, recent fevers, chills, sweats, nausea, vomiting, changes in bowel movements out of his typical.  Vision has remained normal, no changes in hearing.  Has not been otherwise feeling ill recently    Dizziness is more exacerbated when looking to his left than at other times.  Describes dizziness that of vertigo, not lightheadedness.  He does note spinning sensations and appearance of spinning.  Vision has been otherwise normal though      Physical Exam   Triage vitals:  T 36.2 °C (97.1 °F)  HR 69  /72  RR 14  O2 100 % None (Room air)    General: Awake, alert, in no acute distress  Eyes: Gaze conjugate.  No scleral icterus or injection, PERRL  HENT: Normo-cephalic, atraumatic. No stridor   CV: Regular rate, regular rhythm. Radial and DP pulses 2+ bilaterally. Heart sounds are normal, perhaps slight quiet aortic systolic murmur consistent with known history  Resp: Breathing non-labored, speaking in full sentences.  Clear to auscultation bilaterally  Chest: non-tender  GI: Soft, non-distended, non-tender. No rebound or guarding.  MSK/Extremities: No gross bony deformities. Moving all extremities  Skin: Warm. Appropriate color  Neuro: EOMI, PERRL, facial sensation is  intact throughout. All facial movements intact including eyebrow raise, puff cheeks, and smile.  Hearing intact bilaterally, intact swallow, speech is clear and fluent, no dysarthria, shoulder shrug and head turn is intact with 5/5 strength bilaterally, tongue protrusion and movements intact.  Normal finger-nose and heel-to-shin testing  Appropriate strength and sensation throughout the BUEs and BLEs  A+Ox4  Negative India-Hallpike, normal HINTS exam  Psych: Appropriate mood and affect      Medical Decision Making & ED Course   Medical Decision Makin y.o. male with extremely well overall appearance, entirely normal physical examination with no reproducible symptoms with Harper-Hallpike.  His examination was normal without signs of peripheral or central vertigo.  Overall because of this is unclear but will require evaluation of cardiac function with EKG, electrolyte testing.  Will also obtain CBC due to patient's history of taking Skyrizi which may be a cause of anemia.  Otherwise patient is overall extremely well-appearing, only other complaint is a mild left-sided headache which is not acutely concerning to him.  Unlikely that this headache is specifically related to patient's presenting complaint today.      Based off of my examination and patient's overall symptoms as well as the symptoms starting to improve with time but incompletely resolved I have extremely low suspicion for stroke, in the same vein they do not feel that this patient has suffered from a TIA due to his progressive improvement in symptoms.    Will initiate workup with CBC and CMP to evaluate electrolytes.  Other concerns for this patient include meningitis, though this is worth considering, I feel that this is relatively unlikely in this patient.  He is overall extremely well-appearing, headache is very mild and I would not expect symptoms to improved as much as they have in a patient with meningitis.  Other concerns do include possible viral  meningitis, but patient has never had any signs of oral herpes, genital herpes, or otherwise.  In his case with an overall well-appearing patient even a viral meningitis would not require significant workup, once again overall unlikely.    Given the patient is clinically feeling much better, I have a relatively low concern for serious medical issue in this patient.  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  Patient described the symptoms worse when he would look to the left, more pronounced at the beginning of the day gradually improving and by the time I saw the patient about 90% resolved and he is feeling markedly better.  He is well-appearing he is in no distress vital signs are stable he has normal finger-to-nose normal rapid alternating hand motion, his posterior circulation testing was unremarkable.  I did appreciate very faint leftward exhaust will nystagmus although it is no longer precipitating symptoms for him.  He had a mild concomitant left-sided aching atraumatic headache that has since resolved, gradual in onset.  No fever no nuchal rigidity and no concern for intracranial catastrophe such as bleed clot or infection.  Had a discussion with the patient at the bedside following an unremarkable EKG that demonstrated a chronic partial right bundle branch block in the setting of known bicuspid aortic valve and we will pursue a more liberal strategy check electrolytes given slightly dry tongue we will hydrate him and reassess.  I have low suspicion for stroke, patient is already feeling better and the symptoms are very clinically consistent with a benign  positional vertigo.  [facility] [RH]   1848 EKG obtained today interpreted by me shows NSR with rate of 69 bpm.  Normal intervals but QRS is notable for duration of 120 ms.  May have partial block, consistent with partial right-sided conduction delay which is seen previously.  Otherwise no ST changes concerning for ischemia, no evidence of STEMI, no T wave inversions.  Appears consistent with baseline [CJ]      ED Course User Index  [CJ] Fredi Nava MD  [RH] Kan Kat DO         Diagnoses as of 05/17/25 1908   Dizziness         ---      Social Determinants of Health which Significantly Impact Care: None identified     EKG Independent Interpretation: EKG interpreted by myself. Please see ED Course for full interpretation.    The patient was discussed with the following consultants/services: None    Independent Result Review and Interpretation: Relevant laboratory and radiographic results were reviewed and independently interpreted by myself.  As necessary, they are commented on in the ED Course.    Chronic conditions affecting the patient's care: As documented above in MDM    Care Considerations: As documented above in MDM    Disposition   Patient was signed out to Dr. Payne at 1900 pending completion of their work-up.  Please see the next provider's transition of care note for the remainder of the patient's care.     Procedures   Procedures    This was a shared visit with an ED attending.  The patient was seen and discussed with the ED attending    Fredi Nava MD  Emergency Medicine     Fredi Nava MD  Resident  05/17/25 1953

## 2025-05-17 NOTE — DISCHARGE INSTRUCTIONS
You were seen in the emergency room for your dizziness.  It is difficult to say at this time what your symptoms may be due to, a likely theory is that this may have been due to a migraine.  It may assist our diagnostic abilities to come to the ED as soon as you begin having symptoms if this happens again.    We discussed that it is certainly possible that this could have been a TIA but is altogether unlikely.  You have had an echocardiogram very recently and you do not have other risk factors for TIA or stroke.  Based on your overall story this seems relatively unlikely.    If any other concerning symptoms occur or you have any new concerns or recurrence of symptoms please come back to the emergency room

## 2025-05-17 NOTE — ED TRIAGE NOTES
Pt coming in for dizziness that started at 0800 this morning. Pt states he thinks its vertigo but denies any hx of such. Pt denies any pain but complains of NV that stated at the same time as the dizziness. Pt denies any other complaints

## 2025-05-19 NOTE — PROGRESS NOTES
REASON FOR VISIT:  ulcerative colitis    HPI:  Guilherme Ocasio is a 39 y.o. male who presents for follow-up.  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       -h/o bicuspid aortic valve  -nephrolithiasis     Difficult to control disease.  He has tried and failed Humira, Remicade, tofacitinib, upadacitinib, etrasimod (monotherapy), and topical steroids.  Intermittently requires oral steroid rescue.  Currently on risankizumab and etrasimod.      11/2024 F/S  showed persistent proctosigmoiditis with Gale 3 disease in the distal 3-5 cm of the rectum with ulceration and otherwise Gale 2 disease in rectum and sigmoid.  Descending colon remained normal endoscopically.  Path c/w UC; no CMV.  Overall, was feeling worse, recently off of prednisone.  Stools up to 8-10 daily.  FCP > 3000.  Also with some joint discomfort and some oral aphthous ulcers on tongue and gums.  No nocturnal stools.  (+) urgency (+) blood about 2-3 times per week.  Was tolerating etrasimod without difficulty.  Gave him prednisone again 40 mg x 14 days then taper 5 mg per week.  Continue etrasimod (gets via patient program for free) and added rizankizumab.    Completed risankizumab induction early2/2025.  Last seen 2/2025 and had 4 days left of prednisone 5 mg.  Stools 4-5 daily.  Stools not interrupting work.  Most stools between 7AM and 12 noon.  Urgency is better.  No extra trips to bathroom.  When he switched to 5 mg prednisone, stools increased from 2-3 to 4-5 daily.  Some blood in the stool around once daily or every other day, small amount.  Joints good.  Sleeping through the night.  Tolerating risankizumab without difficulty.  Plan was to slowly taper prednisone off.    In follow-up today, he has done two Skyrizi on body injectors and 2 mg etrasimod daily.  No med side effects.  Tapered off prednisone May 9, 2025.  Tapered by 1 mg every two weeks.  Below 5 mg stools have been 3 times daily and loose.  Urgency OK, has 3-5  minutes.  No cramps or pain.  No IBD EIMs.  No visible blood.  Stool is small blobs.  Two stools in AM.  Does not to awaken early.  Does not have to stop on way to work to find BR.    REVIEW OF SYSTEMS      No Known Allergies    Past Medical History:   Diagnosis Date    Arthritis     Bicuspid aortic valve     Disease of thyroid gland     Kidney stones     Ulcerative colitis        Past Surgical History:   Procedure Laterality Date    COLONOSCOPY      OTHER SURGICAL HISTORY  07/27/2016    Cystoscopy With Insertion Of Ureteral Stent Right    OTHER SURGICAL HISTORY  07/27/2016    Lithotripsy       Current Outpatient Medications   Medication Sig Dispense Refill    predniSONE (Deltasone) 1 mg tablet Take 5 tablets daily for 14 days then taper by one tablet every 14 days until off of prednisone 120 tablet 3    risankizumab-rzaa (Skyrizi) 360 mg/2.4 mL (150 mg/mL) wearable injector injection Infuse contents of one onbody injector (360 mg) under the skin every 8 (eight) weeks. 2.4 mL 2     No current facility-administered medications for this visit.       PHYSICAL EXAM:  /71   Pulse 79   Temp 36.5 °C (97.7 °F)   Wt 74.8 kg (165 lb)   SpO2 98% Comment: ra  BMI 25.09 kg/m²   Alert no acute distress  Anicteric  No additional PE today      Lab Results   Component Value Date    WBC 6.0 05/17/2025    HGB 14.9 05/17/2025    HCT 41.6 05/17/2025    MCV 79 (L) 05/17/2025     05/17/2025     Lab Results   Component Value Date    ALT 34 05/17/2025    AST 19 05/17/2025    ALKPHOS 86 05/17/2025    BILITOT 0.7 05/17/2025 5/2025 FECAL CALPROTECTIN 855  === 04/10/24 ===    CT ANGIO CHEST FOR PULMONARY EMBOLISM    - Impression -  No evidence of pulmonary embolism.  Right lower lobe pneumonia.    MACRO:  None    Signed by: Evans Tan 4/10/2024 1:56 PM  Dictation workstation:   [id number]      ASSESSMENT  #UC-clinically doing better on combination of trazodone mild and risankizumab.  However, as he is tapered off  prednisone, stools have gotten somewhat looser.  Fecal calprotectin remains elevated at age 54, but is significantly lower than when last checked several months ago when it was greater than 3000.  As patient is clinically improved, we will not aggressively alter therapy.    He may end up stopping Etrasimod as he has been on a patient assistance program for the past year.  He never really had much response to this and therefore I think it is okay to see how he does on monotherapy with risankizumab.  However, he did have some benefit from mesalamine in the past.  We will start him on mesalamine suppositories (oral mesalamine gave him bloating) at bedtime and see if this helps improve symptoms.  If symptoms improve, can repeat calprotectin.      PLAN  1) continue with risankizumab on body injector every 8 weeks  2) continue Etrasimod as long as it was provided  3) begin mesalamine rectal suppositories 1 at bedtime for 3 to 4 weeks and then, if helping, will return to 1 every other night for 3 to 4 weeks and then 1 every third night or as needed  4) follow-up in the office in 3 months  5) contact the office before then if symptoms worsening or with other questions or concerns

## 2025-05-20 ENCOUNTER — OFFICE VISIT (OUTPATIENT)
Dept: GASTROENTEROLOGY | Facility: HOSPITAL | Age: 40
End: 2025-05-20
Payer: COMMERCIAL

## 2025-05-20 ENCOUNTER — PHARMACY VISIT (OUTPATIENT)
Dept: PHARMACY | Facility: CLINIC | Age: 40
End: 2025-05-20
Payer: COMMERCIAL

## 2025-05-20 VITALS
BODY MASS INDEX: 25.09 KG/M2 | HEART RATE: 79 BPM | WEIGHT: 165 LBS | SYSTOLIC BLOOD PRESSURE: 105 MMHG | TEMPERATURE: 97.7 F | DIASTOLIC BLOOD PRESSURE: 71 MMHG | OXYGEN SATURATION: 98 %

## 2025-05-20 DIAGNOSIS — D84.9 IMMUNOCOMPROMISED STATE: ICD-10-CM

## 2025-05-20 DIAGNOSIS — K51.211 ULCERATIVE PROCTITIS WITH RECTAL BLEEDING (MULTI): Primary | ICD-10-CM

## 2025-05-20 LAB
ALBUMIN SERPL-MCNC: 4.6 G/DL (ref 3.6–5.1)
ALBUMIN/GLOB SERPL: 1.7 (CALC) (ref 1–2.5)
ALP SERPL-CCNC: 80 U/L (ref 36–130)
ALT SERPL-CCNC: 29 U/L (ref 9–46)
AST SERPL-CCNC: 17 U/L (ref 10–40)
BASOPHILS # BLD AUTO: 22 CELLS/UL (ref 0–200)
BASOPHILS NFR BLD AUTO: 0.6 %
BILIRUB DIRECT SERPL-MCNC: 0.1 MG/DL
BILIRUB INDIRECT SERPL-MCNC: 0.5 MG/DL (CALC) (ref 0.2–1.2)
BILIRUB SERPL-MCNC: 0.6 MG/DL (ref 0.2–1.2)
CALPROTECTIN STL-MCNT: 855 MCG/G
EOSINOPHIL # BLD AUTO: 187 CELLS/UL (ref 15–500)
EOSINOPHIL NFR BLD AUTO: 5.2 %
ERYTHROCYTE [DISTWIDTH] IN BLOOD BY AUTOMATED COUNT: 12.9 % (ref 11–15)
GLOBULIN SER CALC-MCNC: 2.7 G/DL (CALC) (ref 1.9–3.7)
HCT VFR BLD AUTO: 42.3 % (ref 38.5–50)
HGB BLD-MCNC: 14.2 G/DL (ref 13.2–17.1)
IGNF NEG CNTRL BLD: NORMAL
LYMPHOCYTES # BLD AUTO: 356 CELLS/UL (ref 850–3900)
LYMPHOCYTES NFR BLD AUTO: 9.9 %
M TB IFN-G BLD-IMP: NEGATIVE
MCH RBC QN AUTO: 27.9 PG (ref 27–33)
MCHC RBC AUTO-ENTMCNC: 33.6 G/DL (ref 32–36)
MCV RBC AUTO: 83.1 FL (ref 80–100)
MITOGEN IGNF.SPOT COUNT BLD: NORMAL
MONOCYTES # BLD AUTO: 518 CELLS/UL (ref 200–950)
MONOCYTES NFR BLD AUTO: 14.4 %
NEUTROPHILS # BLD AUTO: 2516 CELLS/UL (ref 1500–7800)
NEUTROPHILS NFR BLD AUTO: 69.9 %
PLATELET # BLD AUTO: 237 THOUSAND/UL (ref 140–400)
PMV BLD REES-ECKER: 10.2 FL (ref 7.5–12.5)
PROT SERPL-MCNC: 7.3 G/DL (ref 6.1–8.1)
QUEST PANEL A SPOT COUNT: 2
QUEST PANEL B SPOT COUNT: 1
RBC # BLD AUTO: 5.09 MILLION/UL (ref 4.2–5.8)
WBC # BLD AUTO: 3.6 THOUSAND/UL (ref 3.8–10.8)

## 2025-05-20 PROCEDURE — 99213 OFFICE O/P EST LOW 20 MIN: CPT | Performed by: INTERNAL MEDICINE

## 2025-05-20 PROCEDURE — 1036F TOBACCO NON-USER: CPT | Performed by: INTERNAL MEDICINE

## 2025-05-20 PROCEDURE — RXMED WILLOW AMBULATORY MEDICATION CHARGE

## 2025-05-20 RX ORDER — MESALAMINE 1000 MG/1
1000 SUPPOSITORY RECTAL NIGHTLY
Qty: 30 SUPPOSITORY | Refills: 5 | Status: SHIPPED | OUTPATIENT
Start: 2025-05-20 | End: 2025-11-16

## 2025-05-20 ASSESSMENT — ENCOUNTER SYMPTOMS
LOSS OF SENSATION IN FEET: 0
DEPRESSION: 0
OCCASIONAL FEELINGS OF UNSTEADINESS: 0

## 2025-05-20 ASSESSMENT — PAIN SCALES - GENERAL: PAINLEVEL_OUTOF10: 0-NO PAIN

## 2025-05-20 NOTE — PATIENT INSTRUCTIONS
Thank you for coming in for follow-up today.  I am glad that, overall, your symptoms have remained relatively stable despite tapering off of prednisone.  Although the stools are a bit looser and the fecal calprotectin remains elevated, my suggestion at this time is not to change therapy.  We will, however, add some mesalamine rectal suppositories to see if this might improve stool form and perhaps slightly decreased frequency.  As we reviewed today:    PLAN  1) continue with risankizumab on body injector every 8 weeks  2) continue Etrasimod as long as it was provided  3) begin mesalamine rectal suppositories 1 at bedtime for 3 to 4 weeks and then, if helping, will return to 1 every other night for 3 to 4 weeks and then 1 every third night or as needed  4) follow-up in the office in 3 months  5) contact the office before then if symptoms worsening or with other questions or concerns

## 2025-06-04 ENCOUNTER — DOCUMENTATION (OUTPATIENT)
Dept: GASTROENTEROLOGY | Facility: HOSPITAL | Age: 40
End: 2025-06-04
Payer: COMMERCIAL

## 2025-06-04 NOTE — PROGRESS NOTES
Patient contacted me today reporting several weeks of nausea along with some dizziness.  He went to the emergency department once for this and had a negative evaluation.  He held his Velsipity symptoms improved.  He then took a sip he again and had recurrence of symptoms.  He has since held Velsipity.  He had 1 episode of emesis.  He knows to contact the office if his colitis symptoms worsen.

## 2025-06-09 ENCOUNTER — SPECIALTY PHARMACY (OUTPATIENT)
Dept: PHARMACY | Facility: CLINIC | Age: 40
End: 2025-06-09

## 2025-06-09 NOTE — PROGRESS NOTES
[facility] Clinical Note  Patient Reassessment     Introduction  Guilherme Ocasio is a 39 y.o. male who is on the specialty pharmacy service for management of: Gastroenterology Core.      [facility] supplied medication: Skyrizi 360 mg under the skin every 8 weeks       Duration of therapy: Maintenance    The most recent encounter visit with the referring prescriber Kevin Looney MD  on 5/20/25 was reviewed.  Pharmacy will continue to collaborate in the care of this patient with the referring prescriber.    Discussion  Guilherme was contacted on 6/9/2025 at 2:28 PM for a pharmacy visit with encounter number [phone] from:   [facility]  [address] [place] [id number]  Dept: [phone]  Dept Fax: [phone]  Guilherme consented to a/an Telephone visit, which was performed.    Efficacy  Patient has developed new symptoms of condition: No  Patient/caregiver feels medication is affecting the disease state: Patient is doing well on therapy and feels symptoms have been stable    Goals  Provided education on goals and possible outcomes of therapy:  Adherence with therapy  Timely completion of appropriate labs  Timely and appropriate follow up with provider  Identify and address medication interactions with presciption medications, OTC medications and supplements  Optimize or maintain quality of life  Gastroenterology: Improve gastrointestinal clinical symptoms such as abdominal pain, inflammation, diarrhea, constipation, and bleeding, Reduce frequency and urgency of bowel movements, and Allow for GI mucosal healing (observed through endoscopy and colonoscopy)  Patient has documented target(s) for goals of therapy: Yes  Patient status for goal(s): On track    Lab Results   Component Value Date    CALPS 855 (H) 05/14/2025    CALPS >3000 (H) 11/12/2024      Tolerance  Patient has experienced side effects from this medication: No  Changes to current therapy  regimen: No    The follow-up timeline was discussed. Every person responds to and reacts to therapy differently. Patient should be assessed for efficacy and tolerability in approximately: 3 months            Adherence  Patient Information  Informant: Self (Patient)  Demonstrates Understanding of Importance of Adherence: Yes  Does the patient have any barriers to self-administration (including physical and mental?): No  Medication Information  Medication: risankizumab-rzaa (Skyrizi)  Patient Reported Missed Doses in the Last 4 Weeks: 0  Estimated Medication Adherence Level: Good  Barriers to Adherence: No Problems identified   The importance of adherence was discussed and patient/caregiver was advised to take the medication as prescribed by their provider. Encouraged patient/caregiver to call physician's office or specialty pharmacy if they have a question regarding a missed dose.    General Assessment  Changes to home medications, OTCs or supplements: No  Current Medications[1]  Reported new allergies: No  Reported new medical conditions: No  Additional monitoring reviewed: Gastroenterology - CBC-diff:   Lab Results   Component Value Date    WBC 6.0 05/17/2025    RBC 5.30 05/17/2025    HGB 14.9 05/17/2025    HCT 41.6 05/17/2025    MCV 79 (L) 05/17/2025    MCHC 35.8 05/17/2025     05/17/2025    RDW 12.1 05/17/2025    NEUTOPHILPCT 88.4 05/17/2025    IGPCT 0.3 05/17/2025    LYMPHOPCT 4.8 05/17/2025    MONOPCT 5.9 05/17/2025    EOSPCT 0.3 05/17/2025    BASOPCT 0.3 05/17/2025    NEUTROABS 5.28 05/17/2025    LYMPHSABS 0.29 (L) 05/17/2025    MONOSABS 0.35 05/17/2025    EOSABS 0.02 05/17/2025    BASOSABS 0.02 05/17/2025   , CMP:   Lab Results   Component Value Date    GLUCOSE 89 05/17/2025     05/17/2025    K 3.9 05/17/2025     05/17/2025    CO2 25 05/17/2025    ANIONGAP 13 05/17/2025    BUN 16 05/17/2025    CREATININE 0.64 05/17/2025    CALCIUM 9.3 05/17/2025    ALBUMIN 4.5 05/17/2025    ALKPHOS 86  05/17/2025    PROT 7.6 05/17/2025    AST 19 05/17/2025    BILITOT 0.7 05/17/2025    ALT 34 05/17/2025   , LFTs and bilirubin:   Lab Results   Component Value Date    ALT 34 05/17/2025    AST 19 05/17/2025    ALKPHOS 86 05/17/2025    BILITOT 0.7 05/17/2025   , and Fecal calprotectin:   Lab Results   Component Value Date    CALPS 855 (H) 05/14/2025     Is laboratory follow up needed? No    Advised to contact the pharmacy if there are any changes to the patient's medication list, including prescriptions, OTC medications, herbal products, or supplements.    Impression/Plan  This patient has not been identified as high risk due to Lack of high risk qualifiers.  The following action was taken:N/A          QOL/Patient Satisfaction  Rate your quality of life on scale of 1-10: 7  Rate your satisfaction with  Specialty Pharmacy on scale of 1-10: 9    Provided contact information ([phone]) for [facility] and reviewed dispensing process, refill timeline and patient management follow up. Confirmed understanding of education conducted during assessment. All questions and concerns were addressed and patient/caregiver was encouraged to reach out for additional questions or concerns.    Based on the patient's diagnosis, medication list, progress towards goals, adherence, tolerance, and medication list, medication remains appropriate: Therapy remains appropriate (I attest)    KAYCE ENRIQUEZ, PharmD       [1]   Current Outpatient Medications   Medication Sig Dispense Refill    mesalamine (Canasa) 1,000 mg suppository Insert 1 suppository (1,000 mg) into the rectum once daily at bedtime. Use as directed 30 suppository 5    risankizumab-rzaa (Skyrizi) 360 mg/2.4 mL (150 mg/mL) wearable injector injection Infuse contents of one onbody injector (360 mg) under the skin every 8 (eight) weeks. 2.4 mL 2     No current facility-administered medications for this visit.

## 2025-06-13 PROCEDURE — RXMED WILLOW AMBULATORY MEDICATION CHARGE

## 2025-06-15 ENCOUNTER — SPECIALTY PHARMACY (OUTPATIENT)
Dept: PHARMACY | Facility: CLINIC | Age: 40
End: 2025-06-15

## 2025-06-17 ENCOUNTER — APPOINTMENT (OUTPATIENT)
Dept: PRIMARY CARE | Facility: CLINIC | Age: 40
End: 2025-06-17
Payer: COMMERCIAL

## 2025-06-18 ENCOUNTER — PHARMACY VISIT (OUTPATIENT)
Dept: PHARMACY | Facility: CLINIC | Age: 40
End: 2025-06-18
Payer: COMMERCIAL

## 2025-07-13 PROCEDURE — RXMED WILLOW AMBULATORY MEDICATION CHARGE

## 2025-07-14 ENCOUNTER — PHARMACY VISIT (OUTPATIENT)
Dept: PHARMACY | Facility: CLINIC | Age: 40
End: 2025-07-14
Payer: COMMERCIAL

## 2025-08-04 ENCOUNTER — APPOINTMENT (OUTPATIENT)
Dept: RHEUMATOLOGY | Facility: CLINIC | Age: 40
End: 2025-08-04
Payer: COMMERCIAL

## 2025-08-04 ENCOUNTER — SPECIALTY PHARMACY (OUTPATIENT)
Dept: PHARMACY | Facility: CLINIC | Age: 40
End: 2025-08-04

## 2025-08-04 VITALS
WEIGHT: 172 LBS | HEIGHT: 67 IN | HEART RATE: 88 BPM | TEMPERATURE: 97.9 F | SYSTOLIC BLOOD PRESSURE: 129 MMHG | BODY MASS INDEX: 27 KG/M2 | DIASTOLIC BLOOD PRESSURE: 87 MMHG

## 2025-08-04 DIAGNOSIS — M07.60 ENTEROPATHIC ARTHRITIS: ICD-10-CM

## 2025-08-04 PROCEDURE — 99215 OFFICE O/P EST HI 40 MIN: CPT | Performed by: INTERNAL MEDICINE

## 2025-08-04 PROCEDURE — RXMED WILLOW AMBULATORY MEDICATION CHARGE

## 2025-08-04 PROCEDURE — 1036F TOBACCO NON-USER: CPT | Performed by: INTERNAL MEDICINE

## 2025-08-04 PROCEDURE — 3008F BODY MASS INDEX DOCD: CPT | Performed by: INTERNAL MEDICINE

## 2025-08-04 RX ORDER — PREDNISONE 5 MG/1
TABLET ORAL
Qty: 60 TABLET | Refills: 0 | Status: SHIPPED | OUTPATIENT
Start: 2025-08-04 | End: 2025-08-04

## 2025-08-04 RX ORDER — PREDNISONE 5 MG/1
TABLET ORAL
Qty: 70 TABLET | Refills: 0 | Status: SHIPPED | OUTPATIENT
Start: 2025-08-04

## 2025-08-04 ASSESSMENT — PATIENT HEALTH QUESTIONNAIRE - PHQ9
2. FEELING DOWN, DEPRESSED OR HOPELESS: NOT AT ALL
SUM OF ALL RESPONSES TO PHQ9 QUESTIONS 1 & 2: 0
1. LITTLE INTEREST OR PLEASURE IN DOING THINGS: NOT AT ALL

## 2025-08-04 ASSESSMENT — PAIN SCALES - GENERAL: PAINLEVEL_OUTOF10: 10-WORST PAIN EVER

## 2025-08-04 NOTE — PROGRESS NOTES
36 yr old [place] male with H/O ulcerative colitis, and enteropathic arthritis. is here for follow up.  Currently he is on MTX 15mg weekly.  Started in 2019 with shoulder pain followed by pain and swelling in mid foot and toe pain. Had a traumatic injury of left index finger. Saw Dr. Ramírez in 1/2020 and was started on MTX for presumed inflammatory arthritis. MTX improved his symptoms. In 12/21 was diagnosed with ulcerative colitis.   HLA-B27 negative.  Humira started in 8/22 for GI flare and arthritis   Humira was increased to weekly dose in 1/2023 but noticed no improvement in the bowel disease and then discontinued in 6/2023.   Started on mesalamine in 3/23 and then discontinued in 6/23. Tofacitinib was started in 7/10/2023 and developed Noro virus infection and was held for 5 days. Was resumed after that. Tofacitnib was stopped in 12/2023.   Remicade started in 1/24 but discontinued because of inadequate response.    Was started on upadacitinib 45 mg /day and developed pneumonia. Discontinued upadacitnib and started on Etrasimod for UC in 8/24  Rizankizumab  started in 12/2024. Was on combination therapy till 5/25 when Etrasimod was discontinued.     Chief Complaint: Management of enteropathic arthritis    History of Present Illness: At today's visit, the patient reports flaring of her peripheral arthritis. Pain in right elbow, left heel , chest wall and left shoulder Has morning stiffness 3-4 hours. No eye symptoms. No bowel symptoms.   Had Covid infection 2 weeks ago. No more fever or chills but has sinus congestion. No greenish drainage.         Review of Systems    Constitutional: C/O fatigue  Head: Denies headaches or hair loss  Eyes: Denies dry eyes, blurry vision, redness of the eyes, pain in the eyes or H/O uveitis.  ENT: Denies dry mouth, loss of taste, sores in the mouth, or difficulty swallowing  Cardiovascular: Denies chest pain, palpitations  Respiratory: Denies shortness of breath or cough  or wheezing  Gastrointestinal: No diarrhea.   Integumentary: Denies rash or lesions, Raynaud's or psoriasis  Neurological: Denies any numbness or tingling or weakness  MSK: As per HPI.            Active Problems  Problems    · Abdominal pain, lower (789.09) (R10.30)   · Acquired claw toe of left foot (735.5) (M20.5X2)   · Back pain with radiation (724.5) (M54.9)   · Bicuspid aortic valve (746.4) (Q23.1)   · Bright red blood per rectum (569.3) (K62.5)   · Cardiac murmur (785.2) (R01.1)   · Cyst of left kidney (753.10) (N28.1)   · Diarrhea of presumed infectious origin (009.3) (R19.7)   · Enteropathic arthritis (713.1) (M07.60)   · Enthesopathy (726.90) (M77.9)   · Facet arthropathy, lumbar (721.3) (M47.816)   · Finger injury (959.5) (S69.90XA)   · Foot pain, bilateral (729.5) (M79.671,M79.672)   · Hallux rigidus of both feet (735.2) (M20.21,M20.22)   · Hyperthyroidism (242.90) (E05.90)   · Immunocompromised patient (279.9) (D84.9)   · Left foot pain (729.5) (M79.672)   · Left shoulder pain (719.41) (M25.512)   · Low back pain (724.2) (M54.50)   · Myofascial pain (729.1) (M79.18)   · Nephrolithiasis (592.0) (N20.0)   · Sacroiliitis (720.2) (M46.1)   · Segmental and somatic dysfunction of upper extremity (739.7) (M99.07)   · Subclinical hyperthyroidism (242.90) (E05.90)   · Swelling of finger, left (729.81) (M79.89)   · Synovitis (727.00) (M65.9)   · 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,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)   · Vitamin D deficiency (268.9) (E55.9)     Surgical History  Problems    · History of Cystoscopy With Insertion Of Ureteral Stent Right   · History of Lithotripsy     Family History  Mother    · Family history of kidney stones (V18.69) (Z84.1)   · Family history of rheumatoid arthritis (V17.7) (Z82.61)  Father    · Family history of kidney stones (V18.69) (Z84.1)  Sister    · Family history of ulcerative colitis (V18.59) (Z83.79)  Multiple Family Members    · Family history of kidney stones (V18.69)  "(Z84.1)     Social History  Problems    · Full-time employment   · Never smoker   · No illicit drug use   · Recently quit alcohol use   · Single     Allergies  Medication    · No Known Drug Allergies   Recorded By: Adali Kennedy; 10/23/2014 1:07:13 PM  NonMedication    · No Known Food Allergies   Recorded By: Diana Nava; 7/26/2022 11:25:20 AM     Current Meds     Current Outpatient Medications   Medication Sig Dispense Refill    mesalamine (Canasa) 1,000 mg suppository Insert 1 suppository (1,000 mg) into the rectum once daily at bedtime. Use as directed 30 suppository 5    risankizumab-rzaa (Skyrizi) 360 mg/2.4 mL (150 mg/mL) wearable injector injection Infuse contents of one onbody injector (360 mg) under the skin every 8 (eight) weeks. 2.4 mL 2     No current facility-administered medications for this visit.                    Physical Exam  Blood pressure 129/87, pulse 88, temperature 36.6 °C (97.9 °F), height 1.702 m (5' 7\"), weight 78 kg (172 lb).  General - Alert and oriented  Head: Normocephalic, atraumatic  Eyes - PERRLA, EOMI. No conjunctiva injection.   Mouth/ENT - Moist oral and nasal mucosa.   Cardiovascular - Normal S1, S2. Regular rate and rhythm. No murmurs or rubs.  Lungs - Symmetric chest expansion. Clear to auscultation bilaterally.   Skin - No rashes or ulcers. Skin warm and dry. No erythema on bilateral cheeks.  Abdomen - Soft, non-tender. No masses. Normal bowel sounds.  Extremities - No edema, cyanosis ,or clubbing  Neurological - Alert and oriented x 3, grossly intact. No focal deficit.  Musculoskeletal -  EXAMJOINTDETAILED,  Shoulders: Left shoulder painful rotation  Elbows: Right elbow tenderness  Wrists: Full ROM, without pain, no swelling, warmth or tenderness.  MCP: No swelling, warmth or tenderness. Metacarpal squeeze negative  PIP: No swelling, warmth or tenderness.  DIP: No swelling, warmth or tenderness.  Hands : 5/5.   Left heel tenderness        Component      Latest " Ref Rng 5/14/2025 5/17/2025   WBC      4.4 - 11.3 x10*3/uL  6.0    nRBC      0.0 - 0.0 /100 WBCs  0.0    RBC      4.50 - 5.90 x10*6/uL  5.30    HEMOGLOBIN      13.5 - 17.5 g/dL  14.9    HEMATOCRIT      41.0 - 52.0 %  41.6    MCV      80 - 100 fL  79 (L)    MCH      26.0 - 34.0 pg  28.1    MCHC      32.0 - 36.0 g/dL  35.8    RED CELL DISTRIBUTION WIDTH      11.5 - 14.5 %  12.1    Platelets      150 - 450 x10*3/uL  228    Neutrophils %      40.0 - 80.0 %  88.4    Immature Granulocytes %, Automated      0.0 - 0.9 %  0.3    Lymphocytes %      13.0 - 44.0 %  4.8    Monocytes %      2.0 - 10.0 %  5.9    Eosinophils %      0.0 - 6.0 %  0.3    Basophils %      0.0 - 2.0 %  0.3    Neutrophils Absolute      1.20 - 7.70 x10*3/uL  5.28    Immature Granulocytes Absolute, Automated      0.00 - 0.70 x10*3/uL  0.02    Lymphocytes Absolute      1.20 - 4.80 x10*3/uL  0.29 (L)    Monocytes Absolute      0.10 - 1.00 x10*3/uL  0.35    Eosinophils Absolute      0.00 - 0.70 x10*3/uL  0.02    Basophils Absolute      0.00 - 0.10 x10*3/uL  0.02    GLUCOSE      74 - 99 mg/dL  89    SODIUM      136 - 145 mmol/L  139    POTASSIUM      3.5 - 5.3 mmol/L  3.9    CHLORIDE      98 - 107 mmol/L  105    Bicarbonate      21 - 32 mmol/L  25    Anion Gap      10 - 20 mmol/L  13    Blood Urea Nitrogen      6 - 23 mg/dL  16    Creatinine      0.50 - 1.30 mg/dL  0.64    EGFR      >60 mL/min/1.73m*2  >90    Calcium      8.6 - 10.6 mg/dL  9.3    Albumin      3.4 - 5.0 g/dL  4.5    Alkaline Phosphatase      33 - 120 U/L  86    Total Protein      6.4 - 8.2 g/dL  7.6    AST      9 - 39 U/L  19    Bilirubin Total      0.0 - 1.2 mg/dL  0.7    ALT      10 - 52 U/L  34    PROTEIN, TOTAL      6.1 - 8.1 g/dL 7.3     ALBUMIN      3.6 - 5.1 g/dL 4.6     GLOBULIN      1.9 - 3.7 g/dL (calc) 2.7     ALBUMIN/GLOBULIN RATIO      1.0 - 2.5 (calc) 1.7     BILIRUBIN, TOTAL      0.2 - 1.2 mg/dL 0.6     BILIRUBIN, DIRECT      < OR = 0.2 mg/dL 0.1     BILIRUBIN, INDIRECT      0.2  - 1.2 mg/dL (calc) 0.5     ALKALINE PHOSPHATASE      36 - 130 U/L 80     AST      10 - 40 U/L 17     ALT      9 - 46 U/L 29     T-SPOT.TB      Negative  Negative     PANEL A SPOT COUNT CORRECTED FOR NEG CONTROL 2     PANEL B SPOT COUNT CORRECTED FOR NEG CONTROL 1     NEGATIVE CONTROL Passed     POSITIVE CONTROL Passed                 Assessment/plan: 39 yr old WM with UC and enteropathic arthritis: His peripheral arthritis has flared. Discussed about adding a TNF inhibitor as Rizankizumab is not controlling the joint symptoms.   He has had good response with Humira in the past. Will restart adalimumab along with Rizankizumab. Side effects discussed. Start on prednisone 20 mg/day x 1 week and then 15 mg/day x 1 week and 10 mg/day x 1 week and 5 mg /day.   Follow up in 3 months    Reviewed and approved by ADOLPH AGUILAR on 8/4/25 at 10:51 AM.

## 2025-08-05 ENCOUNTER — SPECIALTY PHARMACY (OUTPATIENT)
Dept: PHARMACY | Facility: CLINIC | Age: 40
End: 2025-08-05

## 2025-08-05 LAB
ALBUMIN SERPL-MCNC: 4.6 G/DL (ref 3.6–5.1)
ALP SERPL-CCNC: 81 U/L (ref 36–130)
ALT SERPL-CCNC: 16 U/L (ref 9–46)
ANION GAP SERPL CALCULATED.4IONS-SCNC: 11 MMOL/L (CALC) (ref 7–17)
AST SERPL-CCNC: 12 U/L (ref 10–40)
BASOPHILS # BLD AUTO: 58 CELLS/UL (ref 0–200)
BASOPHILS NFR BLD AUTO: 0.8 %
BILIRUB SERPL-MCNC: 0.4 MG/DL (ref 0.2–1.2)
BUN SERPL-MCNC: 12 MG/DL (ref 7–25)
CALCIUM SERPL-MCNC: 9 MG/DL (ref 8.6–10.3)
CHLORIDE SERPL-SCNC: 103 MMOL/L (ref 98–110)
CO2 SERPL-SCNC: 26 MMOL/L (ref 20–32)
CREAT SERPL-MCNC: 0.69 MG/DL (ref 0.6–1.29)
CRP SERPL-MCNC: 18.8 MG/L
EGFRCR SERPLBLD CKD-EPI 2021: 120 ML/MIN/1.73M2
EOSINOPHIL # BLD AUTO: 432 CELLS/UL (ref 15–500)
EOSINOPHIL NFR BLD AUTO: 6 %
ERYTHROCYTE [DISTWIDTH] IN BLOOD BY AUTOMATED COUNT: 12.4 % (ref 11–15)
GLUCOSE SERPL-MCNC: 85 MG/DL (ref 65–99)
HCT VFR BLD AUTO: 39.9 % (ref 38.5–50)
HGB BLD-MCNC: 13 G/DL (ref 13.2–17.1)
LYMPHOCYTES # BLD AUTO: 1058 CELLS/UL (ref 850–3900)
LYMPHOCYTES NFR BLD AUTO: 14.7 %
MCH RBC QN AUTO: 27.5 PG (ref 27–33)
MCHC RBC AUTO-ENTMCNC: 32.6 G/DL (ref 32–36)
MCV RBC AUTO: 84.4 FL (ref 80–100)
MONOCYTES # BLD AUTO: 482 CELLS/UL (ref 200–950)
MONOCYTES NFR BLD AUTO: 6.7 %
NEUTROPHILS # BLD AUTO: 5170 CELLS/UL (ref 1500–7800)
NEUTROPHILS NFR BLD AUTO: 71.8 %
PLATELET # BLD AUTO: 289 THOUSAND/UL (ref 140–400)
PMV BLD REES-ECKER: 9.1 FL (ref 7.5–12.5)
POTASSIUM SERPL-SCNC: 4 MMOL/L (ref 3.5–5.3)
PROT SERPL-MCNC: 7.7 G/DL (ref 6.1–8.1)
RBC # BLD AUTO: 4.73 MILLION/UL (ref 4.2–5.8)
SODIUM SERPL-SCNC: 140 MMOL/L (ref 135–146)
WBC # BLD AUTO: 7.2 THOUSAND/UL (ref 3.8–10.8)

## 2025-08-06 ENCOUNTER — PHARMACY VISIT (OUTPATIENT)
Dept: PHARMACY | Facility: CLINIC | Age: 40
End: 2025-08-06
Payer: COMMERCIAL

## 2025-08-08 DIAGNOSIS — K51.319 ULCERATIVE COLITIS, RECTOSIGMOID, UNSPECIFIED COMPLICATION (MULTI): ICD-10-CM

## 2025-08-08 RX ORDER — RISANKIZUMAB-RZAA 360 MG/2.4
360 WEARABLE INJECTOR SUBCUTANEOUS
Qty: 2.4 ML | Refills: 2 | Status: SHIPPED | OUTPATIENT
Start: 2025-08-08

## 2025-08-10 PROCEDURE — RXMED WILLOW AMBULATORY MEDICATION CHARGE

## 2025-08-11 ENCOUNTER — SPECIALTY PHARMACY (OUTPATIENT)
Dept: PHARMACY | Facility: CLINIC | Age: 40
End: 2025-08-11

## 2025-08-11 DIAGNOSIS — M07.60 ENTEROPATHIC ARTHRITIS: Primary | ICD-10-CM

## 2025-08-11 DIAGNOSIS — M07.60 ENTEROPATHIC ARTHRITIS: ICD-10-CM

## 2025-08-11 PROCEDURE — RXMED WILLOW AMBULATORY MEDICATION CHARGE

## 2025-08-11 RX ORDER — ADALIMUMAB-ADAZ 40 MG/.4ML
40 INJECTION, SOLUTION SUBCUTANEOUS
Qty: 2.4 ML | Refills: 1 | Status: SHIPPED | OUTPATIENT
Start: 2025-08-11 | End: 2025-08-13 | Stop reason: SDUPTHER

## 2025-08-11 RX ORDER — ADALIMUMAB-ADAZ 40 MG/.4ML
40 INJECTION, SOLUTION SUBCUTANEOUS
Qty: 0.8 ML | Refills: 0 | Status: SHIPPED | OUTPATIENT
Start: 2025-08-11 | End: 2025-08-11 | Stop reason: SDUPTHER

## 2025-08-12 ENCOUNTER — PHARMACY VISIT (OUTPATIENT)
Dept: PHARMACY | Facility: CLINIC | Age: 40
End: 2025-08-12
Payer: COMMERCIAL

## 2025-08-12 ENCOUNTER — APPOINTMENT (OUTPATIENT)
Dept: GASTROENTEROLOGY | Facility: HOSPITAL | Age: 40
End: 2025-08-12
Payer: COMMERCIAL

## 2025-08-13 ENCOUNTER — SPECIALTY PHARMACY (OUTPATIENT)
Dept: PHARMACY | Facility: CLINIC | Age: 40
End: 2025-08-13

## 2025-08-13 ENCOUNTER — TELEMEDICINE CLINICAL SUPPORT (OUTPATIENT)
Dept: PHARMACY | Facility: HOSPITAL | Age: 40
End: 2025-08-13
Payer: COMMERCIAL

## 2025-08-13 DIAGNOSIS — M07.60 ENTEROPATHIC ARTHRITIS: ICD-10-CM

## 2025-08-13 RX ORDER — ADALIMUMAB-ADAZ 40 MG/.4ML
40 INJECTION, SOLUTION SUBCUTANEOUS
Qty: 2.4 ML | Refills: 1 | Status: SHIPPED | OUTPATIENT
Start: 2025-08-13

## 2025-08-15 ENCOUNTER — PHARMACY VISIT (OUTPATIENT)
Dept: PHARMACY | Facility: CLINIC | Age: 40
End: 2025-08-15
Payer: COMMERCIAL

## 2025-09-02 PROCEDURE — RXMED WILLOW AMBULATORY MEDICATION CHARGE

## 2025-09-04 ENCOUNTER — PHARMACY VISIT (OUTPATIENT)
Dept: PHARMACY | Facility: CLINIC | Age: 40
End: 2025-09-04
Payer: COMMERCIAL

## 2025-10-20 ENCOUNTER — APPOINTMENT (OUTPATIENT)
Dept: RHEUMATOLOGY | Facility: CLINIC | Age: 40
End: 2025-10-20
Payer: COMMERCIAL

## 2025-12-15 ENCOUNTER — APPOINTMENT (OUTPATIENT)
Dept: RHEUMATOLOGY | Facility: CLINIC | Age: 40
End: 2025-12-15
Payer: COMMERCIAL

## 2025-12-23 ENCOUNTER — APPOINTMENT (OUTPATIENT)
Dept: PRIMARY CARE | Facility: CLINIC | Age: 40
End: 2025-12-23
Payer: COMMERCIAL